# Patient Record
Sex: MALE | Race: WHITE | NOT HISPANIC OR LATINO | Employment: FULL TIME | URBAN - METROPOLITAN AREA
[De-identification: names, ages, dates, MRNs, and addresses within clinical notes are randomized per-mention and may not be internally consistent; named-entity substitution may affect disease eponyms.]

---

## 2017-08-28 NOTE — PATIENT INSTRUCTIONS
Before Your Surgery      Call your surgeon if there is any change in your health. This includes signs of a cold or flu (such as a sore throat, runny nose, cough, rash or fever).    Do not smoke, drink alcohol or take over the counter medicine (unless your surgeon or primary care doctor tells you to) for the 24 hours before and after surgery.    If you take prescribed drugs: Follow your doctor s orders about which medicines to take and which to stop until after surgery.    Eating and drinking prior to surgery: follow the instructions from your surgeon    Take a shower or bath the night before surgery. Use the soap your surgeon gave you to gently clean your skin. If you do not have soap from your surgeon, use your regular soap. Do not shave or scrub the surgery site.  Wear clean pajamas and have clean sheets on your bed.     [facility]    If you have any questions regarding to your visit please contact your care team:       Team Purple:   Clinic Hours Telephone Number   Dr. Allison Haro     7am-7pm  Monday - Thursday   7am-5pm  Fridays  [phone]  (Appointment scheduling available 24/7)    Questions about your Visit?   Team Line:  [phone]   Urgent Care - [place] and [place] - 11am-9pm Monday-Friday Saturday-Sunday- 9am-5pm   [place] - 5pm-9pm Monday-Friday Saturday-Sunday- 9am-5pm  [phone] - [place]   [phone] - [place]       What options do I have for visits at the clinic other than the traditional office visit?  To expand how we care for you, many of our providers are utilizing electronic visits (e-visits) and telephone visits, when medically appropriate, for interactions with their patients rather than a visit in the clinic.   We also offer nurse visits for many medical concerns. Just like any other service, we will bill your insurance company for this type of visit based on time spent on the phone with your  provider. Not all insurance companies cover these visits. Please check with your medical insurance if this type of visit is covered. You will be responsible for any charges that are not paid by your insurance.      E-visits via [email or web address]:  generally incur a $35.00 fee.  Telephone visits:  Time spent on the phone: *charged based on time that is spent on the phone in increments of 10 minutes. Estimated cost:   5-10 mins $30.00   11-20 mins. $59.00   21-30 mins. $85.00     Use [employer] (secure email communication and access to your chart) to send your primary care provider a message or make an appointment. Ask someone on your Team how to sign up for Genomas.  For a Price Quote for your services, please call our Consumer Price Line at [phone].  As always, Thank you for trusting us with your health care needs!

## 2017-08-28 NOTE — PROGRESS NOTES
[facility]  [address] [place] [address]  [place] [address]  [phone]  Dept: [phone]    PRE-OP EVALUATION:  Today's date: 2017    Sabino Atwood (: 1955) presents for pre-operative evaluation assessment as requested by Dr. Sabino Fregoso.  He requires evaluation and anesthesia risk assessment prior to undergoing surgery/procedure for treatment of torn left medial meniscal tear .  Proposed procedure: left knee arthroscopy     Date of Surgery/ Procedure: 9/15/17  Time of Surgery/ Procedure: [facility]  Hospital/Surgical Facility: [facility] [place], [place]    Primary Physician: Arabella Hendricks  Type of Anesthesia Anticipated: to be determined    Patient has a Health Care Directive or Living Will:  NO    Preop Questions 2017   1.  Do you have a history of heart attack, stroke, stent, bypass or surgery on an artery in the head, neck, heart or legs? No   2.  Do you ever have any pain or discomfort in your chest? No   3.  Do you have a history of  Heart Failure? No   4.   Are you troubled by shortness of breath when:  walking on a level surface, or up a slight hill, or at night? No   5.  Do you currently have a cold, bronchitis or other respiratory infection? No   6.  Do you have a cough, shortness of breath, or wheezing? No   7.  Do you sometimes get pains in the calves of your legs when you walk? No   8. Do you or anyone in your family have previous history of blood clots? No   9.  Do you or does anyone in your family have a serious bleeding problem such as prolonged bleeding following surgeries or cuts? No   10. Have you ever had problems with anemia or been told to take iron pills? No   11. Have you had any abnormal blood loss such as black, tarry or bloody stools? No   12. Have you ever had a blood transfusion? No   13. Have you or any of your relatives ever had problems with anesthesia? No   14. Do you have sleep apnea, excessive snoring or daytime drowsiness? No   15. Do  you have any prosthetic heart valves? No   16. Do you have prosthetic joints? No           HPI:  Patient has had left knee pain and swelling off and on for the past 1 1/2 years. Causing him to walk in a awkward way that is straining his back. Left knee prevents him from riding his bike. Knee hurts when he walks the 8 flights up stairs at his work.                                                       Brief HPI related to upcoming procedure: patient needs arthroscopy for left knee medial meniscal tear       No chronic health problems     MEDICAL HISTORY:                                                    Patient Active Problem List    Diagnosis Date Noted     CARDIOVASCULAR SCREENING; LDL GOAL LESS THAN 160 06/28/2011     Priority: Medium     DDD (degenerative disc disease), lumbar 06/28/2011     Priority: Medium     Lyme disease      Priority: Medium     Nephrolithiasis      Priority: Medium      Past Medical History:   Diagnosis Date     DDD (degenerative disc disease), lumbar 6/28/2011     Lyme disease      Nephrolithiasis      Past Surgical History:   Procedure Laterality Date     microdiscectomy      Multiple surgeries for back issues     Right knee surgery      Right ACL ablated     Current Outpatient Prescriptions   Medication Sig Dispense Refill     IBUPROFEN PO Take 200 mg by mouth every 6 hours       OTC products: None, except as noted above    No Known Allergies   Latex Allergy: NO    Social History   Substance Use Topics     Smoking status: Never Smoker     Smokeless tobacco: Never Used     Alcohol use Yes      Comment: once a month or less (glass of wine)     History   Drug Use No       REVIEW OF SYSTEMS:                                                    C: NEGATIVE for fever, chills, change in weight  I: NEGATIVE for worrisome rashes, moles or lesions  E: NEGATIVE for vision changes or irritation  E/M: NEGATIVE for ear, mouth and throat problems  R: NEGATIVE for significant cough or SOB  B: NEGATIVE  "for masses, tenderness or discharge  CV: NEGATIVE for chest pain, palpitations or peripheral edema  GI: NEGATIVE for nausea, abdominal pain, heartburn, or change in bowel habits  : NEGATIVE for frequency, dysuria, or hematuria  M: NEGATIVE for significant arthralgias or myalgia  N: NEGATIVE for weakness, dizziness or paresthesias  E: NEGATIVE for temperature intolerance, skin/hair changes  H: NEGATIVE for bleeding problems  P: NEGATIVE for changes in mood or affect    EXAM:                                                    /72 (BP Location: Left arm, Patient Position: Chair, Cuff Size: Adult Regular)  Pulse 58  Temp 98.3  F (36.8  C)  Ht 6' 0.5\" (1.842 m)  Wt 244 lb 8 oz (110.9 kg)  SpO2 98%  BMI 32.7 kg/m2    GENERAL APPEARANCE: healthy, alert and no distress     EYES: EOMI,  PERRL     HENT: ear canals and TM's normal and nose and mouth without ulcers or lesions     NECK: no adenopathy, no asymmetry, masses, or scars and thyroid normal to palpation     RESP: lungs clear to auscultation - no rales, rhonchi or wheezes     CV: regular rates and rhythm, normal S1 S2, no S3 or S4 and no murmur, click or rub     ABDOMEN:  soft, nontender, no HSM or masses and bowel sounds normal     MS: extremities normal- no gross deformities noted, no evidence of inflammation in joints, FROM in all extremities.     SKIN: no suspicious lesions or rashes     NEURO: Normal strength and tone, sensory exam grossly normal, mentation intact and speech normal     PSYCH: mentation appears normal. and affect normal/bright     LYMPHATICS: No axillary, cervical, or supraclavicular nodes    DIAGNOSTICS:                                                    EKG: sinus bradycardia, normal axis, normal intervals, no acute ST/T changes c/w ischemia, no LVH by voltage criteria    Recent Labs   Lab Test  09/12/12   1700   HGB  14.6   PLT  291   NA  145*   POTASSIUM  4.6   CR  1.14        IMPRESSION:                                             "        Reason for surgery/procedure: painful left knee   Diagnosis/reason for consult: need for EKG and clearance     The proposed surgical procedure is considered LOW risk.    REVISED CARDIAC RISK INDEX  The patient has the following serious cardiovascular risks for perioperative complications such as (MI, PE, VFib and 3  AV Block):  No serious cardiac risks  INTERPRETATION: 0 risks: Class I (very low risk - 0.4% complication rate)    The patient has the following additional risks for perioperative complications:  No identified additional risks      ICD-10-CM    1. Preop general physical exam Z01.818 EKG 12-lead complete w/read - Clinics   2. Need for prophylactic vaccination with tetanus-diphtheria (TD) Z23 TDAP VACCINE (ADACEL)       RECOMMENDATIONS:                                                      --Consult hospital rounder / IM to assist post-op medical management    --Patient is to take all scheduled medications on the day of surgery EXCEPT for modifications listed below.    APPROVAL GIVEN to proceed with proposed procedure, without further diagnostic evaluation       Signed Electronically by: KENNEY SAM MD    Copy of this evaluation report is provided to requesting physician.    [facility] Preop Guidelines

## 2017-08-29 ENCOUNTER — OFFICE VISIT (OUTPATIENT)
Dept: FAMILY MEDICINE | Facility: CLINIC | Age: 62
End: 2017-08-29
Payer: COMMERCIAL

## 2017-08-29 VITALS
HEART RATE: 58 BPM | DIASTOLIC BLOOD PRESSURE: 72 MMHG | BODY MASS INDEX: 32.4 KG/M2 | TEMPERATURE: 98.3 F | HEIGHT: 73 IN | WEIGHT: 244.5 LBS | SYSTOLIC BLOOD PRESSURE: 132 MMHG | OXYGEN SATURATION: 98 %

## 2017-08-29 DIAGNOSIS — Z23 NEED FOR PROPHYLACTIC VACCINATION WITH TETANUS-DIPHTHERIA (TD): ICD-10-CM

## 2017-08-29 DIAGNOSIS — Z01.818 PREOP GENERAL PHYSICAL EXAM: Primary | ICD-10-CM

## 2017-08-29 PROCEDURE — 90715 TDAP VACCINE 7 YRS/> IM: CPT | Performed by: FAMILY MEDICINE

## 2017-08-29 PROCEDURE — 90471 IMMUNIZATION ADMIN: CPT | Performed by: FAMILY MEDICINE

## 2017-08-29 PROCEDURE — 93000 ELECTROCARDIOGRAM COMPLETE: CPT | Performed by: FAMILY MEDICINE

## 2017-08-29 PROCEDURE — 99204 OFFICE O/P NEW MOD 45 MIN: CPT | Mod: 25 | Performed by: FAMILY MEDICINE

## 2017-08-29 NOTE — MR AVS SNAPSHOT
After Visit Summary   8/29/2017    Sabino Atwood    MRN: [id number]           Patient Information     Date Of Birth          1955        Visit Information        Provider Department      8/29/2017 11:30 AM Allison Lui MD [facility]        Today's Diagnoses     Preop general physical exam    -  1    Need for prophylactic vaccination with tetanus-diphtheria (TD)          Care Instructions      Before Your Surgery      Call your surgeon if there is any change in your health. This includes signs of a cold or flu (such as a sore throat, runny nose, cough, rash or fever).    Do not smoke, drink alcohol or take over the counter medicine (unless your surgeon or primary care doctor tells you to) for the 24 hours before and after surgery.    If you take prescribed drugs: Follow your doctor s orders about which medicines to take and which to stop until after surgery.    Eating and drinking prior to surgery: follow the instructions from your surgeon    Take a shower or bath the night before surgery. Use the soap your surgeon gave you to gently clean your skin. If you do not have soap from your surgeon, use your regular soap. Do not shave or scrub the surgery site.  Wear clean pajamas and have clean sheets on your bed.     [facility]    If you have any questions regarding to your visit please contact your care team:       Team Purple:   Clinic Hours Telephone Number   Dr. Allison Haro     7am-7pm  Monday - Thursday   7am-5pm  Fridays  [phone]  (Appointment scheduling available 24/7)    Questions about your Visit?   Team Line:  [phone]   Urgent Care - [place] - 11am-9pm Monday-Friday Saturday-Sunday- 9am-5pm   [place] - 5pm-9pm Monday-Friday Saturday-Sunday- 9am-5pm  [phone] - [place]  [phone] - [place]       What options do I have for visits at the clinic other than the  traditional office visit?  To expand how we care for you, many of our providers are utilizing electronic visits (e-visits) and telephone visits, when medically appropriate, for interactions with their patients rather than a visit in the clinic.   We also offer nurse visits for many medical concerns. Just like any other service, we will bill your insurance company for this type of visit based on time spent on the phone with your provider. Not all insurance companies cover these visits. Please check with your medical insurance if this type of visit is covered. You will be responsible for any charges that are not paid by your insurance.      E-visits via Planspott:  generally incur a $35.00 fee.  Telephone visits:  Time spent on the phone: *charged based on time that is spent on the phone in increments of 10 minutes. Estimated cost:   5-10 mins $30.00   11-20 mins. $59.00   21-30 mins. $85.00     Use [employer] (secure email communication and access to your chart) to send your primary care provider a message or make an appointment. Ask someone on your Team how to sign up for [employer].  For a Price Quote for your services, please call our Workface Line at [phone].  As always, Thank you for trusting us with your health care needs!              Follow-ups after your visit        Who to contact     If you have questions or need follow up information about today's clinic visit or your schedule please contact [facility] directly at [phone].  Normal or non-critical lab and imaging results will be communicated to you by S[employer] NOWhart, letter or phone within 4 business days after the clinic has received the results. If you do not hear from us within 7 days, please contact the clinic through Planspott or phone. If you have a critical or abnormal lab result, we will notify you by phone as soon as possible.  Submit refill requests through [employer] or call your pharmacy and they will forward the refill request to us.  "Please allow 3 business days for your refill to be completed.          Additional Information About Your Visit        MyChart Information     Wizperthart gives you secure access to your electronic health record. If you see a primary care provider, you can also send messages to your care team and make appointments. If you have questions, please call your primary care clinic.  If you do not have a primary care provider, please call [phone] and they will assist you.        Care EveryWhere ID     This is your Care EveryWhere ID. This could be used by other organizations to access your [place] medical records  [id number]        Your Vitals Were     Pulse Temperature Height Pulse Oximetry BMI (Body Mass Index)       58 98.3  F (36.8  C) 6' 0.5\" (1.842 m) 98% 32.7 kg/m2        Blood Pressure from Last 3 Encounters:   08/29/17 132/72   09/12/12 112/60   06/28/11 114/72    Weight from Last 3 Encounters:   08/29/17 244 lb 8 oz (110.9 kg)   09/12/12 242 lb 12.8 oz (110.1 kg)   06/28/11 243 lb (110.2 kg)              We Performed the Following     EKG 12-lead complete w/read - Clinics     TDAP VACCINE (ADACEL)        Primary Care Provider Office Phone # Fax #    Arabella Hendricks [phone]       XXX NO INFO FOUND XXX  XXX [place] [address]        Equal Access to Services     [facility]: Hadii aad ku hadasho Sokassi, waaxda luqadaha, qaybta kaalmada donald, gordy mustafa . So [facility] [phone].    ATENCIÓN: Si habla español, tiene a tavarez disposición servicios gratuitos de asistencia lingüística. Liss al [phone].    We comply with applicable federal civil rights laws and [place] laws. We do not discriminate on the basis of race, color, national origin, age, disability sex, sexual orientation or gender identity.            Thank you!     Thank you for choosing [facility]  for your care. Our goal is always to provide you with excellent care. Hearing back from our " patients is one way we can continue to improve our services. Please take a few minutes to complete the written survey that you may receive in the mail after your visit with us. Thank you!             Your Updated Medication List - Protect others around you: Learn how to safely use, store and throw away your medicines at www.disposemymeds.org.          This list is accurate as of: 8/29/17 12:08 PM.  Always use your most recent med list.                   Brand Name Dispense Instructions for use Diagnosis    IBUPROFEN PO      Take 200 mg by mouth every 6 hours

## 2017-08-29 NOTE — NURSING NOTE
"Chief Complaint   Patient presents with     Pre-Op Exam       Initial /72 (BP Location: Left arm, Patient Position: Chair, Cuff Size: Adult Regular)  Pulse 58  Temp 98.3  F (36.8  C)  Ht 6' 0.5\" (1.842 m)  Wt 244 lb 8 oz (110.9 kg)  SpO2 98%  BMI 32.7 kg/m2 Estimated body mass index is 32.7 kg/(m^2) as calculated from the following:    Height as of this encounter: 6' 0.5\" (1.842 m).    Weight as of this encounter: 244 lb 8 oz (110.9 kg).  Medication Reconciliation: complete   Shaniqua Porter MA      "

## 2017-08-29 NOTE — Clinical Note
Please abstract the following data from this visit with this patient into the appropriate field in Epic:  Colonoscopy done on this date: 3/11/2008 (approximately), by this group: Care Everywhere, results were normal.

## 2017-08-29 NOTE — NURSING NOTE
Screening Questionnaire for Adult Immunization    Are you sick today?   No   Do you have allergies to medications, food, a vaccine component or latex?   No   Have you ever had a serious reaction after receiving a vaccination?   No   Do you have a long-term health problem with heart disease, lung disease, asthma, kidney disease, metabolic disease (e.g. diabetes), anemia, or other blood disorder?   No   Do you have cancer, leukemia, HIV/AIDS, or any other immune system problem?   No   In the past 3 months, have you taken medications that affect  your immune system, such as prednisone, other steroids, or anticancer drugs; drugs for the treatment of rheumatoid arthritis, Crohn s disease, or psoriasis; or have you had radiation treatments?   No   Have you had a seizure, or a brain or other nervous system problem?   No   During the past year, have you received a transfusion of blood or blood     products, or been given immune (gamma) globulin or antiviral drug?   No   For women: Are you pregnant or is there a chance you could become        pregnant during the next month?   No   Have you received any vaccinations in the past 4 weeks?   No     Immunization questionnaire answers were all negative.        Per orders of Dr. Lui, injection of Tdap (Adacel) given by An Fontenot. Patient instructed to remain in clinic for 15 minutes afterwards, and to report any adverse reaction to me immediately.       Screening performed by An Fontenot on 8/29/2017 at 12:19 PM.

## 2020-03-01 ENCOUNTER — HEALTH MAINTENANCE LETTER (OUTPATIENT)
Age: 65
End: 2020-03-01

## 2020-06-25 ENCOUNTER — OFFICE VISIT (OUTPATIENT)
Dept: FAMILY MEDICINE | Facility: CLINIC | Age: 65
End: 2020-06-25
Payer: COMMERCIAL

## 2020-06-25 VITALS
BODY MASS INDEX: 31.83 KG/M2 | WEIGHT: 248 LBS | RESPIRATION RATE: 14 BRPM | HEIGHT: 74 IN | DIASTOLIC BLOOD PRESSURE: 80 MMHG | HEART RATE: 70 BPM | SYSTOLIC BLOOD PRESSURE: 130 MMHG | TEMPERATURE: 97.5 F | OXYGEN SATURATION: 98 %

## 2020-06-25 DIAGNOSIS — I82.4Y2 ACUTE DEEP VEIN THROMBOSIS (DVT) OF PROXIMAL VEIN OF LEFT LOWER EXTREMITY (H): ICD-10-CM

## 2020-06-25 DIAGNOSIS — R59.9 ADENOPATHY: ICD-10-CM

## 2020-06-25 DIAGNOSIS — M79.89 LEFT LEG SWELLING: Primary | ICD-10-CM

## 2020-06-25 LAB
BASOPHILS # BLD AUTO: 0 10E9/L (ref 0–0.2)
BASOPHILS NFR BLD AUTO: 0.3 %
D DIMER PPP FEU-MCNC: 1.4 UG/ML FEU (ref 0–0.5)
DIFFERENTIAL METHOD BLD: NORMAL
EOSINOPHIL # BLD AUTO: 0.1 10E9/L (ref 0–0.7)
EOSINOPHIL NFR BLD AUTO: 1.2 %
ERYTHROCYTE [DISTWIDTH] IN BLOOD BY AUTOMATED COUNT: 14.9 % (ref 10–15)
HCT VFR BLD AUTO: 43 % (ref 40–53)
HGB BLD-MCNC: 14 G/DL (ref 13.3–17.7)
LYMPHOCYTES # BLD AUTO: 1.2 10E9/L (ref 0.8–5.3)
LYMPHOCYTES NFR BLD AUTO: 20.4 %
MCH RBC QN AUTO: 27.9 PG (ref 26.5–33)
MCHC RBC AUTO-ENTMCNC: 32.6 G/DL (ref 31.5–36.5)
MCV RBC AUTO: 86 FL (ref 78–100)
MONOCYTES # BLD AUTO: 0.6 10E9/L (ref 0–1.3)
MONOCYTES NFR BLD AUTO: 9.9 %
NEUTROPHILS # BLD AUTO: 4 10E9/L (ref 1.6–8.3)
NEUTROPHILS NFR BLD AUTO: 68.2 %
PLATELET # BLD AUTO: 211 10E9/L (ref 150–450)
RBC # BLD AUTO: 5.02 10E12/L (ref 4.4–5.9)
WBC # BLD AUTO: 5.9 10E9/L (ref 4–11)

## 2020-06-25 PROCEDURE — 85379 FIBRIN DEGRADATION QUANT: CPT | Performed by: FAMILY MEDICINE

## 2020-06-25 PROCEDURE — 99214 OFFICE O/P EST MOD 30 MIN: CPT | Performed by: FAMILY MEDICINE

## 2020-06-25 PROCEDURE — 85025 COMPLETE CBC W/AUTO DIFF WBC: CPT | Performed by: FAMILY MEDICINE

## 2020-06-25 PROCEDURE — 36415 COLL VENOUS BLD VENIPUNCTURE: CPT | Performed by: FAMILY MEDICINE

## 2020-06-25 ASSESSMENT — MIFFLIN-ST. JEOR: SCORE: 1984.67

## 2020-06-26 ENCOUNTER — TELEPHONE (OUTPATIENT)
Dept: FAMILY MEDICINE | Facility: CLINIC | Age: 65
End: 2020-06-26

## 2020-06-26 ENCOUNTER — ANCILLARY PROCEDURE (OUTPATIENT)
Dept: ULTRASOUND IMAGING | Facility: CLINIC | Age: 65
End: 2020-06-26
Attending: FAMILY MEDICINE
Payer: COMMERCIAL

## 2020-06-26 DIAGNOSIS — R59.9 ADENOPATHY: ICD-10-CM

## 2020-06-26 DIAGNOSIS — M79.89 LEFT LEG SWELLING: ICD-10-CM

## 2020-06-26 LAB — RADIOLOGIST FLAGS: ABNORMAL

## 2020-06-26 PROCEDURE — 93971 EXTREMITY STUDY: CPT | Mod: LT | Performed by: STUDENT IN AN ORGANIZED HEALTH CARE EDUCATION/TRAINING PROGRAM

## 2020-06-26 NOTE — TELEPHONE ENCOUNTER
Reason for call:  Results   Name of test or procedure:  Ultra sound   Date of test or procedure: 6-  Location of test or procedure:  [place]    Additional comments:  Call for results    Phone number to reach patient:  Home number on file [phone] (home)    Best Time:   Any     Can we leave a detailed message on this number?  YES    Travel screening: Not Applicable

## 2020-06-26 NOTE — TELEPHONE ENCOUNTER
Dr. Fuller notified and already aware of US results that were positive for DVT in L lower extremity.     Ally Smith RN

## 2020-07-13 ENCOUNTER — OFFICE VISIT (OUTPATIENT)
Dept: FAMILY MEDICINE | Facility: CLINIC | Age: 65
End: 2020-07-13
Payer: COMMERCIAL

## 2020-07-13 VITALS
BODY MASS INDEX: 31.2 KG/M2 | TEMPERATURE: 97.3 F | HEART RATE: 71 BPM | DIASTOLIC BLOOD PRESSURE: 62 MMHG | WEIGHT: 243 LBS | SYSTOLIC BLOOD PRESSURE: 118 MMHG | OXYGEN SATURATION: 98 %

## 2020-07-13 DIAGNOSIS — I82.4Y2 ACUTE DEEP VEIN THROMBOSIS (DVT) OF PROXIMAL VEIN OF LEFT LOWER EXTREMITY (H): ICD-10-CM

## 2020-07-13 DIAGNOSIS — L60.0 INGROWING RIGHT GREAT TOENAIL: ICD-10-CM

## 2020-07-13 DIAGNOSIS — I82.402 ACUTE DEEP VEIN THROMBOSIS (DVT) OF LEFT LOWER EXTREMITY, UNSPECIFIED VEIN (H): Primary | ICD-10-CM

## 2020-07-13 DIAGNOSIS — L30.8 PRURITIC DERMATITIS: ICD-10-CM

## 2020-07-13 PROCEDURE — 99214 OFFICE O/P EST MOD 30 MIN: CPT | Performed by: FAMILY MEDICINE

## 2020-07-13 NOTE — PROGRESS NOTES
Subjective     Sabino Atwood is a 64 year old male who presents to clinic today for the following health issues:    HPI   Musculoskeletal problem/pain      Duration: Had DVT in the LLE on Xeralto    Description  Location: Left leg    Intensity:  Slightly better    Accompanying signs and symptoms: rash    History  Previous similar problem: no   Previous evaluation:  none    Precipitating or alleviating factors:  Trauma or overuse: no   Aggravating factors include: none    Therapies tried and outcome: lotion    DVT: Lt leg; subsequent  Had DVT, possibly from prolonged sitting as working from home during the lock down.  Swelling improved a little. No pain.  He has no personal or family history of blood clots.    Rash on the leg:    Very itchy, mainly on left.    Ingrown toe Nail    This has been going on for a very long time, when worse just trims it.    Patient Active Problem List   Diagnosis     CARDIOVASCULAR SCREENING; LDL GOAL LESS THAN 160     DDD (degenerative disc disease), lumbar     Lyme disease     Nephrolithiasis     Past Surgical History:   Procedure Laterality Date     ARTHROSCOPY KNEE Left 9/2017    medial meniscal tear      microdiscectomy      Multiple surgeries for back issues     Right knee surgery      Right ACL ablated       Social History     Tobacco Use     Smoking status: Never Smoker     Smokeless tobacco: Never Used   Substance Use Topics     Alcohol use: Yes     Comment: once a month or less (glass of wine)     Family History   Problem Relation Age of Onset     Heart Disease Mother         CHF     Neurologic Disorder Father         Polio with respiratory complications.     Arthritis Brother      Family History Negative Maternal Grandmother      Family History Negative Maternal Grandfather      Family History Negative Paternal Grandmother      Family History Negative Paternal Grandfather          Review of Systems   Constitutional, HEENT, cardiovascular, pulmonary, gi and gu systems are  negative, except as otherwise noted.      Objective    /62   Pulse 71   Temp 97.3  F (36.3  C) (Oral)   Wt 110.2 kg (243 lb)   SpO2 98%   BMI 31.20 kg/m    Body mass index is 31.2 kg/m .  Physical Exam   GENERAL: healthy, alert and no distress  RESP: lungs clear to auscultation - no rales, rhonchi or wheezes  CV: regular rate and rhythm, no murmur, click or rub, no peripheral edema   MS:   Legs: isolated excoriation charlie in the shins  Lt Leg   Moderate swelling, no calf tenderness  Rt Foot    In grown toe nail    Assessment & Plan     Sabino was seen today for musculoskeletal problem.    Diagnoses and all orders for this visit:    Acute deep vein thrombosis (DVT) of left lower extremity, unspecified vein (H)    Continue Xeralto for at least 3 months. This was a provoked DVT.        -    Xeralto    Ingrowing right great toenail        -    Will return for possible wedge resection.    Acute deep vein thrombosis (DVT) of proximal vein of left lower extremity (H)  -     rivaroxaban ANTICOAGULANT (XARELTO) 20 MG TABS tablet; Take 1 tablet (20 mg) by mouth daily (with dinner)    Pruritic dermatitis: legs       -    Doing calamine lotion.  Other:        -     Lipid panel reflex to direct LDL Fasting; Future  -     **Hepatitis C Screen Reflex to RNA FUTURE anytime; Future      Return in about 1 month (around 8/13/2020) for Routine Visit.    Carlos Fuller MD  [facility]

## 2020-07-14 DIAGNOSIS — I82.402 ACUTE DEEP VEIN THROMBOSIS (DVT) OF LEFT LOWER EXTREMITY, UNSPECIFIED VEIN (H): ICD-10-CM

## 2020-07-14 LAB
CHOLEST SERPL-MCNC: 180 MG/DL
HDLC SERPL-MCNC: 55 MG/DL
LDLC SERPL CALC-MCNC: 104 MG/DL
NONHDLC SERPL-MCNC: 125 MG/DL
TRIGL SERPL-MCNC: 103 MG/DL

## 2020-07-14 PROCEDURE — 86803 HEPATITIS C AB TEST: CPT | Performed by: FAMILY MEDICINE

## 2020-07-14 PROCEDURE — 80061 LIPID PANEL: CPT | Performed by: FAMILY MEDICINE

## 2020-07-14 PROCEDURE — 36415 COLL VENOUS BLD VENIPUNCTURE: CPT | Performed by: FAMILY MEDICINE

## 2020-07-15 LAB — HCV AB SERPL QL IA: NONREACTIVE

## 2020-08-12 NOTE — PROGRESS NOTES
Subjective     Sabino Atwood is a 65 year old male who presents to clinic today for the following health issues:    HPI       Chief Complaint   Patient presents with     Ingrown Toenail     Right great toe x 25 years     Has ingrown toe nail in Rt foot with recurrent flare up, but been persistent lately.  He is taking Xarelto for DVT    Review of Systems   Constitutional, HEENT, cardiovascular, pulmonary, gi and gu systems are negative, except as otherwise noted.      Objective    There were no vitals taken for this visit.  There is no height or weight on file to calculate BMI.  Physical Exam   GENERAL: healthy, alert and no distress  RESP: lungs clear to auscultation - no rales, rhonchi or wheezes  CV: regular rate and rhythm, no murmur, click or rub, no peripheral edema   MS: Ingrwon toe nail Rt great toe medially    Assessment & Plan     Sabino was seen today for ingrown toenail.    Diagnoses and all orders for this visit:    Ingrowing nail, right great toe     Procedure:   Procedure:  After informed consent was obtained, using Betadine for cleansing and 1% Lidocaine without epinephrine for anesthetic, with sterile technique, wedge resection of ingrown nail was performed. Antibiotic dressing is applied, and wound care instructions provided.  Be alert for any signs of cutaneous infection. The procedure was well tolerated without complications. Continue soaking and applying bacitracin ointment daily.  Aftercare: Let nail grow out past nail fold on side of toe.  Follow up: The patient may return prn..      Return in about 1 month (around 9/14/2020) for Physical with fasting labs.    Carlos Fuller MD  [facility]

## 2020-08-14 ENCOUNTER — OFFICE VISIT (OUTPATIENT)
Dept: FAMILY MEDICINE | Facility: CLINIC | Age: 65
End: 2020-08-14
Payer: COMMERCIAL

## 2020-08-14 VITALS
SYSTOLIC BLOOD PRESSURE: 120 MMHG | OXYGEN SATURATION: 98 % | DIASTOLIC BLOOD PRESSURE: 74 MMHG | WEIGHT: 255 LBS | TEMPERATURE: 97.6 F | HEART RATE: 59 BPM | BODY MASS INDEX: 32.74 KG/M2

## 2020-08-14 DIAGNOSIS — L60.0 INGROWING NAIL, RIGHT GREAT TOE: Primary | ICD-10-CM

## 2020-08-14 PROCEDURE — 11765 WEDGE EXCISION SKN NAIL FOLD: CPT | Mod: T5 | Performed by: FAMILY MEDICINE

## 2020-09-11 NOTE — PROGRESS NOTES
"  SUBJECTIVE:   Sabino Atwood is a 65 year old male who presents for Preventive Visit.    Are you in the first 12 months of your Medicare Part B coverage?  No    Physical Health:    In general, how would you rate your overall physical health? good    Outside of work, how many days during the week do you exercise? 2-3 days/week    Outside of work, approximately how many minutes a day do you exercise?greater than 60 minutes    If you drink alcohol do you typically have >3 drinks per day or >7 drinks per week? Not Applicable    Do you usually eat at least 4 servings of fruit and vegetables a day, include whole grains & fiber and avoid regularly eating high fat or \"junk\" foods? NO    Do you have any problems taking medications regularly?  No    Do you have any side effects from medications? no    Needs assistance for the following daily activities: no assistance needed    Which of the following safety concerns are present in your home?  none identified     Hearing impairment: No    In the past 6 months, have you been bothered by leaking of urine? no    Mental Health:    In general, how would you rate your overall mental or emotional health? good  PHQ-2 Score:      Do you feel safe in your environment? YES    Have you ever done Advance Care Planning? (For example, a Health Directive, POLST, or a discussion with a medical provider or your loved ones about your wishes): No, advance care planning information given to patient to review.  Patient plans to discuss their wishes with loved ones or provider.      Additional concerns to address?  No    Fall risk: No     click delete button to remove this line now  Cognitive Screenin) Repeat 3 items (Leader, Season, Table)    2) Clock draw: NORMAL  3) 3 item recall: Recalls 2 objects   Results: NORMAL clock, 1-2 items recalled: COGNITIVE IMPAIRMENT LESS LIKELY    Mini-CogTM Copyright S Ariana. Licensed by the author for use in [facility]; reprinted with " permission (isaiah@[email or web address]). All rights reserved.      Do you have sleep apnea, excessive snoring or daytime drowsiness?: no    -------------------------------------    Reviewed and updated as needed this visit by clinical staff  Allergies  Meds         Reviewed and updated as needed this visit by Provider        Social History     Tobacco Use     Smoking status: Never Smoker     Smokeless tobacco: Never Used   Substance Use Topics     Alcohol use: Yes     Comment: once a month or less (glass of wine)     Urine flow: Slowed down                      Current providers sharing in care for this patient include:   Patient Care Team:  [facility] as [facility]CP - General  Carlos Fuller MD as Assigned PCP    The following health maintenance items are reviewed in Epic and correct as of today:  Health Maintenance   Topic Date Due     ADVANCE CARE PLANNING  1955     HIV SCREENING  08/11/1970     ZOSTER IMMUNIZATION (1 of 2) 08/11/2005     FALL RISK ASSESSMENT  08/11/2020     AORTIC ANEURYSM SCREENING (SYSTEM ASSIGNED)  08/11/2020     INFLUENZA VACCINE (1) 09/01/2020     PNEUMOCOCCAL IMMUNIZATION 65+ LOW/MEDIUM RISK (1 of 2 - PCV13) 08/11/2020     MEDICARE ANNUAL WELLNESS VISIT  09/14/2021     COLORECTAL CANCER SCREENING  05/05/2024     LIPID  07/14/2025     DTAP/TDAP/TD IMMUNIZATION (2 - Td) 08/29/2027     HEPATITIS C SCREENING  Completed     PHQ-2  Completed     IPV IMMUNIZATION  Aged Out     MENINGITIS IMMUNIZATION  Aged Out     HEPATITIS B IMMUNIZATION  Aged Out     Patient Active Problem List   Diagnosis     CARDIOVASCULAR SCREENING; LDL GOAL LESS THAN 160     DDD (degenerative disc disease), lumbar     Lyme disease     Nephrolithiasis     Past Surgical History:   Procedure Laterality Date     ARTHROSCOPY KNEE Left 9/2017    medial meniscal tear      microdiscectomy      Multiple surgeries for back issues     Right knee surgery      Right ACL ablated       Social History     Tobacco Use     Smoking status:  "Never Smoker     Smokeless tobacco: Never Used   Substance Use Topics     Alcohol use: Yes     Comment: once a month or less (glass of wine)     Family History   Problem Relation Age of Onset     Heart Disease Mother         CHF     Neurologic Disorder Father         Polio with respiratory complications.     Arthritis Brother      Family History Negative Maternal Grandmother      Family History Negative Maternal Grandfather      Family History Negative Paternal Grandmother      Family History Negative Paternal Grandfather          Pneumonia Vaccine:Adults age 65+ who received Pneumovax (PPSV23) at 65 years or older: Should be given PCV13 > 1 year after their most recent PPSV23    ROS:  Constitutional, HEENT, cardiovascular, pulmonary, gi and gu systems are negative, except as otherwise noted.    OBJECTIVE:   /82   Pulse 55   Temp 97.5  F (36.4  C) (Oral)   Wt 114.8 kg (253 lb)   SpO2 97%   BMI 32.48 kg/m   Estimated body mass index is 32.48 kg/m  as calculated from the following:    Height as of 6/25/20: 1.88 m (6' 2\").    Weight as of this encounter: 114.8 kg (253 lb).  EXAM:   GENERAL: healthy, alert and no distress  EYES: Eyes grossly normal to inspection, PERRL and conjunctivae and sclerae normal  HENT: ear canals and TM's normal, nose and mouth without ulcers or lesions  NECK: no adenopathy and thyroid normal to palpation  RESP: lungs clear to auscultation - no rales, rhonchi or wheezes  CV: regular rate and rhythm, normal S1 S2, no S3 or S4, no murmur, click or rub, no peripheral edema   ABDOMEN: soft, nontender,no masses and bowel sounds normal  : Non tender solitary adenopathy in left groin  MS: no gross musculoskeletal defects noted, no edema  SKIN: no suspicious lesions or rashes  NEURO: Normal strength and tone, mentation intact and speech normal  PSYCH: mentation appears normal, affect normal/bright    ASSESSMENT / PLAN:   Sabino was seen today for wellness visit and flu shot.    Diagnoses " "and all orders for this visit:    Encounter for Medicare annual wellness exam  -     Basic metabolic panel  -     PSA, screen    Slow urinary stream       -     Been going on for awhile. PSA    Inguinal adenopathy (Lt solitary adenopathy)      -    Lt groin.        CBC was normal. Will check PSA; and consider Imaging ? CT    Acute deep vein thrombosis (DVT) of proximal vein of lower extremity, unspecified laterality (H)      -   Started treatment 6/25/2020, provisionally for 3 months.    Need for vaccination  -     Pneumococcal vaccine 23 valent PPSV23  (Pneumovax) [64805]    Other orders  -     HC FLU VACCINE, INCREASED ANTIGEN, PRESV FREE [52910]    COUNSELING:  Reviewed preventive health counseling, as reflected in patient instructions       Regular exercise       Healthy diet/nutrition       Bladder control       Fall risk prevention       Immunizations    Vaccinated for: Influenza and Pneumococcal             Prostate cancer screening    Estimated body mass index is 32.48 kg/m  as calculated from the following:    Height as of 6/25/20: 1.88 m (6' 2\").    Weight as of this encounter: 114.8 kg (253 lb).    Weight management plan: Discussed healthy diet and exercise guidelines    He reports that he has never smoked. He has never used smokeless tobacco.    Appropriate preventive services were discussed with this patient, including applicable screening as appropriate for cardiovascular disease, diabetes, osteopenia/osteoporosis, and glaucoma.  As appropriate for age/gender, discussed screening for colorectal cancer, prostate cancer, breast cancer, and cervical cancer. Checklist reviewing preventive services available has been given to the patient.    Reviewed patients plan of care and provided an AVS. The Basic Care Plan (routine screening as documented in Health Maintenance) for Sabino meets the Care Plan requirement. This Care Plan has been established and reviewed with the Patient.    Counseling Resources:  ATP " IV Guidelines  Pooled Cohorts Equation Calculator  Breast Cancer Risk Calculator  BRCA-Related Cancer Risk Assessment: FHS-7 Tool  FRAX Risk Assessment  ICSI Preventive Guidelines  Dietary Guidelines for Americans, 2010  USDA's MyPlate  ASA Prophylaxis  Lung CA Screening    Carlos Fuller MD  [facility]

## 2020-09-11 NOTE — PATIENT INSTRUCTIONS
Patient Education   Personalized Prevention Plan  You are due for the preventive services outlined below.  Your care team is available to assist you in scheduling these services.  If you have already completed any of these items, please share that information with your care team to update in your medical record.  Health Maintenance Due   Topic Date Due     Discuss Advance Care Planning  1955     HIV Screening  08/11/1970     Zoster (Shingles) Vaccine (1 of 2) 08/11/2005     Annual Wellness Visit  08/11/2020     FALL RISK ASSESSMENT  08/11/2020     AORTIC ANEURYSM SCREENING (SYSTEM ASSIGNED)  08/11/2020     Flu Vaccine (1) 09/01/2020     Pneumococcal Vaccine (1 of 2 - PCV13) 08/11/2020

## 2020-09-14 ENCOUNTER — OFFICE VISIT (OUTPATIENT)
Dept: FAMILY MEDICINE | Facility: CLINIC | Age: 65
End: 2020-09-14
Payer: COMMERCIAL

## 2020-09-14 VITALS
TEMPERATURE: 97.5 F | HEART RATE: 55 BPM | SYSTOLIC BLOOD PRESSURE: 130 MMHG | OXYGEN SATURATION: 97 % | BODY MASS INDEX: 32.48 KG/M2 | WEIGHT: 253 LBS | DIASTOLIC BLOOD PRESSURE: 82 MMHG

## 2020-09-14 DIAGNOSIS — R59.0 INGUINAL ADENOPATHY: ICD-10-CM

## 2020-09-14 DIAGNOSIS — R39.198 SLOW URINARY STREAM: ICD-10-CM

## 2020-09-14 DIAGNOSIS — I82.4Y9 ACUTE DEEP VEIN THROMBOSIS (DVT) OF PROXIMAL VEIN OF LOWER EXTREMITY, UNSPECIFIED LATERALITY (H): ICD-10-CM

## 2020-09-14 DIAGNOSIS — Z00.00 ENCOUNTER FOR MEDICARE ANNUAL WELLNESS EXAM: Primary | ICD-10-CM

## 2020-09-14 DIAGNOSIS — Z23 NEED FOR VACCINATION: ICD-10-CM

## 2020-09-14 LAB
ANION GAP SERPL CALCULATED.3IONS-SCNC: 7 MMOL/L (ref 3–14)
BUN SERPL-MCNC: 22 MG/DL (ref 7–30)
CALCIUM SERPL-MCNC: 9 MG/DL (ref 8.5–10.1)
CHLORIDE SERPL-SCNC: 108 MMOL/L (ref 94–109)
CO2 SERPL-SCNC: 27 MMOL/L (ref 20–32)
CREAT SERPL-MCNC: 1.19 MG/DL (ref 0.66–1.25)
GFR SERPL CREATININE-BSD FRML MDRD: 64 ML/MIN/{1.73_M2}
GLUCOSE SERPL-MCNC: 92 MG/DL (ref 70–99)
POTASSIUM SERPL-SCNC: 4.3 MMOL/L (ref 3.4–5.3)
PSA SERPL-ACNC: 0.64 UG/L (ref 0–4)
SODIUM SERPL-SCNC: 142 MMOL/L (ref 133–144)

## 2020-09-14 PROCEDURE — 80048 BASIC METABOLIC PNL TOTAL CA: CPT | Performed by: FAMILY MEDICINE

## 2020-09-14 PROCEDURE — 36415 COLL VENOUS BLD VENIPUNCTURE: CPT | Performed by: FAMILY MEDICINE

## 2020-09-14 PROCEDURE — 99397 PER PM REEVAL EST PAT 65+ YR: CPT | Mod: 25 | Performed by: FAMILY MEDICINE

## 2020-09-14 PROCEDURE — G0103 PSA SCREENING: HCPCS | Performed by: FAMILY MEDICINE

## 2020-09-14 PROCEDURE — 90732 PPSV23 VACC 2 YRS+ SUBQ/IM: CPT | Performed by: FAMILY MEDICINE

## 2020-09-14 PROCEDURE — 90472 IMMUNIZATION ADMIN EACH ADD: CPT | Performed by: FAMILY MEDICINE

## 2020-09-14 PROCEDURE — 90662 IIV NO PRSV INCREASED AG IM: CPT | Performed by: FAMILY MEDICINE

## 2020-09-14 PROCEDURE — 90471 IMMUNIZATION ADMIN: CPT | Performed by: FAMILY MEDICINE

## 2021-04-15 ENCOUNTER — E-VISIT (OUTPATIENT)
Dept: URGENT CARE | Facility: URGENT CARE | Age: 66
End: 2021-04-15
Payer: COMMERCIAL

## 2021-04-15 DIAGNOSIS — Z20.822 SUSPECTED COVID-19 VIRUS INFECTION: ICD-10-CM

## 2021-04-15 DIAGNOSIS — Z20.822 SUSPECTED COVID-19 VIRUS INFECTION: Primary | ICD-10-CM

## 2021-04-15 PROCEDURE — U0005 INFEC AGEN DETEC AMPLI PROBE: HCPCS | Performed by: PHYSICIAN ASSISTANT

## 2021-04-15 PROCEDURE — 99421 OL DIG E/M SVC 5-10 MIN: CPT | Performed by: PHYSICIAN ASSISTANT

## 2021-04-15 PROCEDURE — U0003 INFECTIOUS AGENT DETECTION BY NUCLEIC ACID (DNA OR RNA); SEVERE ACUTE RESPIRATORY SYNDROME CORONAVIRUS 2 (SARS-COV-2) (CORONAVIRUS DISEASE [COVID-19]), AMPLIFIED PROBE TECHNIQUE, MAKING USE OF HIGH THROUGHPUT TECHNOLOGIES AS DESCRIBED BY CMS-2020-01-R: HCPCS | Performed by: PHYSICIAN ASSISTANT

## 2021-04-15 NOTE — PATIENT INSTRUCTIONS
Dear Sabino Atwood,    Your symptoms show that you may have coronavirus (COVID-19). This illness can cause fever, cough and trouble breathing. Many people get a mild case and get better on their own. Some people can get very sick.    Will I be tested for COVID-19?  We would like to test you for Covid-19 virus. I have placed orders for this test.     To schedule: go to your [employer] home page and scroll down to the section that says  You have an appointment that needs to be scheduled  and click the large green button that says  Schedule Now  and follow the steps to find the next available openings.    If you are unable to complete these [employer] scheduling steps, please call [phone] to schedule your testing.     Return to work/school/ guidance:  Please let your workplace manager and staffing office know when your quarantine ends     We can t give you an exact date as it depends on the above. You can calculate this on your own or work with your manager/staffing office to calculate this. (For example if you were exposed on 10/4, you would have to quarantine for 14 full days. That would be through 10/18. You could return on 10/19.)      If you receive a positive COVID-19 test result, follow the guidance of the those who are giving you the results. Usually the return to work is 10 (or in some cases 20 days from symptom onset.) If you work at [facility], you must also be cleared by Employee Occupational Health and Safety to return to work.        If you receive a negative COVID-19 test result and did not have a high risk exposure to someone with a known positive COVID-19 test, you can return to work once you're free of fever for 24 hours without fever-reducing medication and your symptoms are improving or resolved.      If you receive a negative COVID-19 test and If you had a high risk exposure to someone who has tested positive for COVID-19 then you can return to work 14 days after your last contact  with the positive individual    Note: If you have ongoing exposure to the covid positive person, this quarantine period may be more than 14 days. (For example, if you are continued to be exposed to your child who tested positive and cannot isolate from them, then the quarantine of 7-14 days can't start until your child is no longer contagious. This is typically 10 days from onset of the child's symptoms. So the total duration may be 17-24 days in this case.)    Sign up for [employer].   We know it's scary to hear that you might have COVID-19. We want to track your symptoms to make sure you're okay over the next 2 weeks. Please look for an email from [employer]--this is a free, online program that we'll use to keep in touch. To sign up, follow the link in the email you will receive. Learn more at [email or web address]    How can I take care of myself?    Get lots of rest. Drink extra fluids (unless a doctor has told you not to)    Take Tylenol (acetaminophen) or ibuprofen for fever or pain. If you have liver or kidney problems, ask your family doctor if it's okay to take Tylenol o ibuprofen    If you have other health problems (like cancer, heart failure, an organ transplant or severe kidney disease): Call your specialty clinic if you don't feel better in the next 2 days.    Know when to call 911. Emergency warning signs include:  o Trouble breathing or shortness of breath  o Pain or pressure in the chest that doesn't go away  o Feeling confused like you haven't felt before, or not being able to wake up  o Bluish-colored lips or face    Where can I get more information?  [facility] [employer] - About COVID-19:   [email or web address]    CDC - What to Do If You're Sick:   www.cdc.gov/coronavirus/2019-ncov/about/steps-when-sick.html

## 2021-04-16 ENCOUNTER — TELEPHONE (OUTPATIENT)
Dept: URGENT CARE | Facility: URGENT CARE | Age: 66
End: 2021-04-16

## 2021-04-16 LAB
LABORATORY COMMENT REPORT: ABNORMAL
SARS-COV-2 RNA RESP QL NAA+PROBE: NORMAL
SARS-COV-2 RNA RESP QL NAA+PROBE: POSITIVE
SPECIMEN SOURCE: ABNORMAL
SPECIMEN SOURCE: NORMAL

## 2021-04-16 NOTE — TELEPHONE ENCOUNTER
Coronavirus (COVID-19) Notification    Reason for call  Notify of POSITIVE  COVID-19 lab result, assess symptoms,  review [facility] recommendations    Lab Result   Lab test for 2019-nCoV rRt-PCR or SARS-COV-2 PCR  Oropharyngeal AND/OR nasopharyngeal swabs were POSITIVE for 2019-nCoV RNA [OR] SARS-COV-2 RNA (COVID-19) RNA     We have been unable to reach Patient by phone at this time to notify of their Positive COVID-19 result.  Left voicemail message requesting a call back to [phone] [facility] for results.        POSITIVE COVID-19 Letter sent.    Rosario Rodrigez LPN

## 2021-08-19 ENCOUNTER — TELEPHONE (OUTPATIENT)
Dept: OTHER | Facility: CLINIC | Age: 66
End: 2021-08-19

## 2021-09-15 ENCOUNTER — TELEPHONE (OUTPATIENT)
Dept: OTHER | Facility: CLINIC | Age: 66
End: 2021-09-15

## 2021-09-15 ENCOUNTER — OFFICE VISIT (OUTPATIENT)
Dept: OTHER | Facility: CLINIC | Age: 66
End: 2021-09-15
Attending: INTERNAL MEDICINE
Payer: COMMERCIAL

## 2021-09-15 VITALS
HEART RATE: 52 BPM | SYSTOLIC BLOOD PRESSURE: 142 MMHG | RESPIRATION RATE: 16 BRPM | WEIGHT: 256 LBS | DIASTOLIC BLOOD PRESSURE: 88 MMHG | OXYGEN SATURATION: 98 % | BODY MASS INDEX: 33.93 KG/M2 | HEIGHT: 73 IN

## 2021-09-15 DIAGNOSIS — I87.009 POST-THROMBOTIC SYNDROME: ICD-10-CM

## 2021-09-15 DIAGNOSIS — E66.811 CLASS 1 OBESITY WITHOUT SERIOUS COMORBIDITY WITH BODY MASS INDEX (BMI) OF 33.0 TO 33.9 IN ADULT, UNSPECIFIED OBESITY TYPE: ICD-10-CM

## 2021-09-15 DIAGNOSIS — I82.512 CHRONIC DEEP VEIN THROMBOSIS (DVT) OF FEMORAL VEIN OF LEFT LOWER EXTREMITY (H): Primary | ICD-10-CM

## 2021-09-15 DIAGNOSIS — I83.893 VARICOSE VEINS OF BILATERAL LOWER EXTREMITIES WITH OTHER COMPLICATIONS: ICD-10-CM

## 2021-09-15 PROCEDURE — 99205 OFFICE O/P NEW HI 60 MIN: CPT | Performed by: INTERNAL MEDICINE

## 2021-09-15 PROCEDURE — G0463 HOSPITAL OUTPT CLINIC VISIT: HCPCS

## 2021-09-15 ASSESSMENT — MIFFLIN-ST. JEOR: SCORE: 1995.09

## 2021-09-15 NOTE — PATIENT INSTRUCTIONS
1. Use compression stockings 20-30 mm hg thigh high closed toes day time and elevate legs when able     2. Take adult aspirin 325 mg daily with food     3. Go for venous comp studies then virtual visit with me    4. Lose weight     5. Please get Covid vaccine

## 2021-09-15 NOTE — TELEPHONE ENCOUNTER
Follow up to 9/15/21:    Please arrange for:      BLE Venous competency ultrasound    Virtual visit one week later.    Rocío Em RN BSN  [facility]  [phone]

## 2021-09-15 NOTE — PROGRESS NOTES
"[facility]        Patient is here for a consult to discuss DVT/PE      BP (!) 142/88 (BP Location: Left arm, Patient Position: Chair, Cuff Size: Adult Large)   Pulse 52   Resp 16   Ht 6' 1\" (1.854 m)   Wt 256 lb (116.1 kg)   SpO2 98%   BMI 33.78 kg/m      The provider has been notified that the patient has concerns of about his left lower leg.     Questions patient would like addressed today are: N/A.    Refills are needed: No    Has homecare services and agency name:  [facility]ini [facility]      "

## 2021-09-15 NOTE — PROGRESS NOTES
[facility] INITIAL VASCULAR MEDICINE CONSULT    ( NEW PATIENT VISIT)     PRIMARY HEALTH CARE PROVIDER:   iglesia Fuller MD       REFERRING HEALTH CARE PROVIDER;  Self referred        REASON FOR CONSULT: Evaluation and management of first lifetime thromboembolic event involving extensive left lower extremity DVT from femoral vein to below-knee veins diagnosed in June 2020 and treated with 3-month duration of Xarelto then followed by aspirin and he stopped that medication      HPI: Sabino Atwood is a 66 year old very pleasant obese male non-smoker during pandemic working from home sitting almost 8 to 10 hours a day developed left lower extremity swelling discomfort pain and underwent evaluation in June 2020 extensive DVT as delineated below and treated with Xarelto 3 months followed by aspirin and aspirin was also stopped recently after he was planning to donate plasma.  He was diagnosed Covid in April 2021.  He never received vaccine.  No personal history of malignancy.  He is obese with positional snoring but no apneic episodes and no daytime somnolence.  He now developed varicose veins and still left lower extremity looks larger than right side but no pain.  He is not using any compression stockings    He is new to this clinic reviewed available records in the epic and updated chart    PAST MEDICAL HISTORY  Past Medical History:   Diagnosis Date     DDD (degenerative disc disease), lumbar 6/28/2011     Lyme disease      Nephrolithiasis        CURRENT MEDICATIONS  None    PAST SURGICAL HISTORY:  Past Surgical History:   Procedure Laterality Date     ARTHROSCOPY KNEE Left 9/2017    medial meniscal tear      microdiscectomy      Multiple surgeries for back issues     Right knee surgery      Right ACL ablated       ALLERGIES     Allergies   Allergen Reactions     Tetanus Toxoid Other (See Comments)     Had shot in 2006       FAMILY HISTORY  Family History   Problem Relation Age  of Onset     Heart Disease Mother         CHF     Neurologic Disorder Father         Polio with respiratory complications.     Arthritis Brother      Family History Negative Maternal Grandmother      Family History Negative Maternal Grandfather      Family History Negative Paternal Grandmother      Family History Negative Paternal Grandfather        VASCULAR FAMILY HISTORY  1st order relative with atherosclerotic PAD: No  1st order relative with AAA: No  Family history of Familial Hyperlipidemia No  Family History of Hypercoagulable state:No    VASCULAR RISK FACTORS  1. Diabetes:No   2. Smoking: has never smoked.  3. HTN: uncontrolled  4.Hyperlipidemia: No      SOCIAL HISTORY  Social History     Socioeconomic History     Marital status:      Spouse name: Not on file     Number of children: Not on file     Years of education: Not on file     Highest education level: Not on file   Occupational History     Not on file   Tobacco Use     Smoking status: Never Smoker     Smokeless tobacco: Never Used   Substance and Sexual Activity     Alcohol use: Yes     Comment: once a month or less (glass of wine)     Drug use: No     Sexual activity: Not Currently   Other Topics Concern     Parent/sibling w/ CABG, MI or angioplasty before 65F 55M? Not Asked   Social History Narrative     Not on file     Social Determinants of Health     Financial Resource Strain:      Difficulty of Paying Living Expenses:    Food Insecurity:      Worried About Running Out of Food in the Last Year:      Ran Out of Food in the Last Year:    Transportation Needs:      Lack of Transportation (Medical):      Lack of Transportation (Non-Medical):    Physical Activity:      Days of Exercise per Week:      Minutes of Exercise per Session:    Stress:      Feeling of Stress :    Social Connections:      Frequency of Communication with Friends and Family:      Frequency of Social Gatherings with Friends and Family:      Attends [employer]ondo[employer] Services:       "Active Member of Clubs or Organizations:      Attends Club or Organization Meetings:      Marital Status:    Intimate Partner Violence:      Fear of Current or Ex-Partner:      Emotionally Abused:      Physically Abused:      Sexually Abused:        ROS:   General: No change in weight, sleep or appetite.  Normal energy.  No fever or chills  Eyes: Negative for vision changes or eye problems  ENT: No problems with ears, nose or throat.  No difficulty swallowing.  Resp: No coughing, wheezing or shortness of breath  CV: No chest pains or palpitations  GI: No nausea, vomiting,  heartburn, abdominal pain, diarrhea, constipation or change in bowel habits  : No urinary frequency or dysuria, bladder or kidney problems  Musculoskeletal: No significant muscle or joint pains, previous history of arthroscopic knee surgery and back microdiscectomy etc.  Neurologic: No headaches, numbness, tingling, weakness, problems with balance or coordination  Psychiatric: No problems with anxiety, depression or mental health  Heme/immune/allergy: No history of bleeding or clotting problems or anemia.  No allergies or immune system problems  Endocrine: No history of thyroid disease, diabetes or other endocrine disorders  Skin: No rashes,worrisome lesions or skin problems  Vascular:  No claudication, lifestyle limiting or otherwise; no ischemic rest pain; no non-healing ulcers. No weakness, No loss of sensation    Left lower extremity is larger than the right side with varicose veins left more than right side    EXAM:  BP (!) 142/88 (BP Location: Left arm, Patient Position: Chair, Cuff Size: Adult Large)   Pulse 52   Resp 16   Ht 6' 1\" (1.854 m)   Wt 256 lb (116.1 kg)   SpO2 98%   BMI 33.78 kg/m    In general, the patient is a pleasant male in no apparent distress.    HEENT: NC/AT.  PERRLA.  EOMI.  Sclerae white, not injected.  Nares clear.  Pharynx without erythema or exudate.  Dentition intact.    Neck: No adenopathy.  No thyromegaly. " Carotids +2/2 bilaterally without bruits.  No jugular venous distension.   Heart: RRR. Normal S1, S2 splits physiologically. No murmur, rub, click, or gallop. The PMI is in the 5th ICS in the midclavicular line. There is no heave.    Lungs: CTA.  No ronchi, wheezes, rales.  No dullness to percussion.   Abdomen: Soft, nontender, nondistended. No organomegaly. No AAA.  No bruits.   Extremities: Vascular:   Left lower extremity is larger than right side with a trace pitting edema  Palpable pulses femoral, popliteal, DP PT bilaterally, left lower extremity bi-tri phasic pedal pulses  Varicose veins left much worse with CEAP 4 CVI and right side CEAP 1 CVI  No foot ulcers or leg ulcers    Labs:  LIPID RESULTS:  Lab Results   Component Value Date    CHOL 180 07/14/2020    HDL 55 07/14/2020     (H) 07/14/2020    TRIG 103 07/14/2020       LIVER ENZYME RESULTS:  Lab Results   Component Value Date    AST 37 09/12/2012    ALT 35 09/12/2012       CBC RESULTS:  Lab Results   Component Value Date    WBC 5.9 06/25/2020    RBC 5.02 06/25/2020    HGB 14.0 06/25/2020    HCT 43.0 06/25/2020    MCV 86 06/25/2020    MCH 27.9 06/25/2020    MCHC 32.6 06/25/2020    RDW 14.9 06/25/2020     06/25/2020       BMP RESULTS:  Lab Results   Component Value Date     09/14/2020    POTASSIUM 4.3 09/14/2020    CHLORIDE 108 09/14/2020    CO2 27 09/14/2020    ANIONGAP 7 09/14/2020    GLC 92 09/14/2020    BUN 22 09/14/2020    CR 1.19 09/14/2020    GFRESTIMATED 64 09/14/2020    GFRESTBLACK 74 09/14/2020    BRIGID 9.0 09/14/2020        Procedures:     EXAMINATION: DOPPLER VENOUS ULTRASOUND OF THE LEFT LOWER EXTREMITY,  6/26/2020 9:55 AM      COMPARISON: None.     HISTORY: Left leg swelling     TECHNIQUE:  Gray-scale evaluation with compression, spectral flow, and  color Doppler assessment of the deep venous system of the left leg  from groin to knee, and then at the ankle.     FINDINGS:  Nonocclusive thrombus left femoral vein mid thigh  extending into the  distal thigh.  In the popliteal vein their is occlusive thrombus which  extends into the posterior tibial and peroneal veins.   The posterior  tibial vein is patent at the ankle.     Patent right common femoral vein for comparison.     Incidental benign appearing left inguinal lymph node.                                                                      IMPRESSION:  Positive DVT study.  Left lower extremity non occlusive  and occlusive thrombus involving the mid thigh femoral vein into the  calf veins as above.      [Urgent Result: Left lower extremity DVT]     Finding was identified on 6/26/2020 10:02 AM.      Dr. Fuller was contacted by Dr. Pete at 6/26/2020 10:02 AM and  verbalized understanding of the urgent finding.      CARINE PETE MD      Assessment and Plan:     1. Chronic deep vein thrombosis (DVT) of femoral vein of left lower extremity (H) ( extensive DVT FV to bleow knee veins 6/2020) appears provoked prolonged sitting 8-10 hours a day working from home , first life time PE event .      2. Post thrombotic syndrome     3. Varicose veins of bilateral lower extremities Left > RT CEAP 4 CVI and CEAP 1 CVI respectively  with other complications    4. Class 1 obesity without serious comorbidity with body mass index (BMI) of 33.0 to 33.9 in adult, unspecified obesity type      This is a very pleasant 66-year-old male developed provoked first lifetime thromboembolic event in his left lower extremity nonocclusive thrombus in the femoral vein and occlusive thrombus in the popliteal, below-knee veins in June 2020.  He was working from home 8 to 10 hours of sitting.  No chest pain or shortness of breath.  He was treated with Xarelto 3 months then switched to aspirin and recently he stopped aspirin as well when he was planning to donate plasma.  Now he developed left lower extremity swelling with minimal edema no pain there is a redness erythema.  He is non-smoker, no personal history of  malignancy.  No family history of hypercoagulable status.  He has a previous history of microdiscectomy and also arthroscopic knee surgeries.    Reviewed pathophysiology treatment options and given education sheets on venous insufficiency, DVT and varicose veins etc..  Trace leg edema with discoloration part of the venous insufficiency and post thrombotic syndrome.  He will benefit with compression stockings prescription given thigh-high 20 to 30 mmHg utilize daytime and elevate the leg when able  We will arrange venous competency studies then followed by virtual visit.  No need for hypercoagulable studies this is first lifetime thromboembolic event provoked  Suggested patient to take aspirin 325 mg coated with the food daily  Lose weight  If snoring persists or daytime somnolence consider going for formal sleep apnea evaluation  Establish care with primary care physician  Suggested patient to receive Covid vaccine    This note was dictated by utilizing Dragon software !    Copy of this note to primary care physician      60 minutes spent on the date of the encounter doing chart review, history and exam, documentation, and further activities as noted above.    Adamaris Laughlin MD, DIXON, FSVM,FNLA  Vascular Medicine  Clinical lipidologist  Clinical hypertension specialist

## 2021-09-30 ENCOUNTER — ANCILLARY PROCEDURE (OUTPATIENT)
Dept: ULTRASOUND IMAGING | Facility: CLINIC | Age: 66
End: 2021-09-30
Attending: INTERNAL MEDICINE
Payer: COMMERCIAL

## 2021-09-30 DIAGNOSIS — I82.512 CHRONIC DEEP VEIN THROMBOSIS (DVT) OF FEMORAL VEIN OF LEFT LOWER EXTREMITY (H): ICD-10-CM

## 2021-09-30 DIAGNOSIS — I87.009 POST-THROMBOTIC SYNDROME: ICD-10-CM

## 2021-09-30 DIAGNOSIS — I83.893 VARICOSE VEINS OF BILATERAL LOWER EXTREMITIES WITH OTHER COMPLICATIONS: ICD-10-CM

## 2021-09-30 PROCEDURE — 93970 EXTREMITY STUDY: CPT | Performed by: SURGERY

## 2021-10-02 ENCOUNTER — HEALTH MAINTENANCE LETTER (OUTPATIENT)
Age: 66
End: 2021-10-02

## 2021-10-11 ENCOUNTER — VIRTUAL VISIT (OUTPATIENT)
Dept: OTHER | Facility: CLINIC | Age: 66
End: 2021-10-11
Attending: INTERNAL MEDICINE
Payer: COMMERCIAL

## 2021-10-11 DIAGNOSIS — I87.009 POST-THROMBOTIC SYNDROME: ICD-10-CM

## 2021-10-11 DIAGNOSIS — I82.512 CHRONIC DEEP VEIN THROMBOSIS (DVT) OF FEMORAL VEIN OF LEFT LOWER EXTREMITY (H): ICD-10-CM

## 2021-10-11 DIAGNOSIS — E66.811 CLASS 1 OBESITY WITHOUT SERIOUS COMORBIDITY WITH BODY MASS INDEX (BMI) OF 33.0 TO 33.9 IN ADULT, UNSPECIFIED OBESITY TYPE: ICD-10-CM

## 2021-10-11 DIAGNOSIS — I83.893 VARICOSE VEINS OF BILATERAL LOWER EXTREMITIES WITH OTHER COMPLICATIONS: ICD-10-CM

## 2021-10-11 PROCEDURE — 99213 OFFICE O/P EST LOW 20 MIN: CPT | Mod: 95 | Performed by: INTERNAL MEDICINE

## 2021-10-11 RX ORDER — ASPIRIN 81 MG/1
81 TABLET ORAL DAILY
COMMUNITY
End: 2022-04-13

## 2021-10-11 NOTE — PATIENT INSTRUCTIONS
1.  Utilize compression stockings in both legs and elevate the legs when able    2.  Office visit with me in 6 months

## 2021-10-11 NOTE — PROGRESS NOTES
Sabino is a 66 year old who is being evaluated via a billable video visit.      How would you like to obtain your AVS? Mail a copy  If the video visit is dropped, the invitation should be resent by: Text to cell phone: [phone], press 1 to contact the patient. This will be a telephone visit, not a video visit.     Will anyone else be joining your video visit? No      Provider visit note:    Chief complaint:  Follow-up visit  Review of recent venous competency studies  Using compression stockings in the left leg  Leg symptoms better since last visit    History of present illness:     For full details please see my initial consult note    This is a very pleasant 66-year-old male initially seen in the office in 2021 for evaluation and management of first lifetime thromboembolic event involving extensive left lower extremity DVT from femoral vein to below-knee veins diagnosed in 2020 and treated with 3-month duration of Xarelto then followed by aspirin and he stopped that medication at the time of last visit but restarted 325 mg daily and underwent venous competency studies he still does have chronic DVT in the distal femoral vein and popliteal vein on the left side right side GSV and SSV incompetent and no thrombus    He started using compression stockings and overall leg symptoms are better    Review of systems: Reviewed all 12 point review of systems as per HPI otherwise unremarkable    Physical exam:( no physical exam done this is virtual visit)    Reviewed recent laboratory tests, imaging studies in the epic and updated chart    Name:  Sabino Atwood                                             Patient ID: [id number]  Date: 2021                                         : 1955  Sex: male                                                                    Examined by: SANDRA Owen RVT  Age:  66 year old                                                         Reading MD: JARON Reyes  Cyrus     INDICATION:  Patient has a history of left lower extremity DVT and pain and swelling in his left leg.      EXAM TYPE  BILATERAL LOWER EXTREMITY VENOUS DUPLEX FOR VENOUS INSUFFICIENCY  TECHNICAL SUMMARY     A duplex ultrasound study using color flow was performed, to evaluate the bilateral lower extremity veins for valvular incompetence with the patient in a steep reversed trendelenberg.      RIGHT:     The deep veins demonstrate phasic flow, compress and respond to augmentations.  There is no DVT.  The common femoral vein is incompetent and free of thrombus. The remaining deep veins are competent and free of thrombus.      The GSV demonstrates phasic flow, compresses and responds to augmentations from the saphenofemoral junction to the ankle with no evidence of thrombus. The great saphenous vein measures 7.8 mm at the saphenofemoral junction, 7.3 mm at the proximal thigh, and 4.3 mm at the knee. The GSV is incompetent from Proximal Thigh to Mid Thigh, with the greatest reflux time of 1138 milliseconds.        The AASV is not visualized.     The Giacomini vein is competent ( 2.1 mm) communicating with the small saphenous vein at the knee level.      The SSV demonstrates phasic flow, compresses and responds to augmentations from the popliteal space to the ankle.  No thrombus is seen.  The saphenopopliteal junction is absent. The SSV is incompetent from the Proximal Calf  with a reflux time of 3151 milliseconds.      Perforators: there is no evidence of incompetent  veins at any level.        LEFT:     The distal femoral vein and popliteal veins has chronic non occlusive thrombus. Partial compression was noted with color flow and response to distal augmentation in the distal femoral and popliteal veins. The remaining deep veins demonstrate phasic flow, compress and respond to augmentations.  There is no reflux or DVT.  The common femoral, proximal to distal femoral and popliteal veins are  incompetent.     The GSV demonstrates phasic flow, compresses and responds to augmentations from the saphenofemoral junction to the ankle with no evidence of thrombus. The great saphenous vein measures 10.2 mm at the saphenofemoral junction, 10.9 mm at the proximal thigh and 7.5 mm at the knee. The GSV is incompetent from SFJ to Proximal Thigh and again from the mid calf to the ankle, with the greatest reflux time of 5724 milliseconds.  The GSV gives rise to a varicose branch measuring 4.2 mm off the  Proximal Calf that courses Medial with a reflux time of 2409 milliseconds.          The AASV is competent ( 2.2 mm) draining into the saphenofemoral junction.     The Giacomini vein is competent ( 1.9 mm) communicating with the small saphenous vein at the knee level.      The SSV demonstrates phasic flow, compresses and responds to augmentations from the popliteal space to the ankle.  No reflux or thrombus is seen. The saphenopopliteal junction is absent.      Perforators: There is an incompetent  vein ( 3.5 mm) at 12 cm inferior to the medial knee crease that communicates with PTV to GSV.        FINAL SUMMARY:  1.         Left chronic non-occlusive thrombus is noted in distal femoral vein and the popliteal  2.         Right common femoral vein and left common, proximal to distal femoral, and popliteal vein incompetence.  3.         Right great saphenous vein incompetence.  4.         Right small saphenous vein incompetence.  5.         Left great saphenous vein incompetence.  6.         Left  incompetence.  7.         Left varicose vein incompetence.  8.         The time of incompetence is greater than 500 milliseconds in  and superficial veins and greater than 1000 millisecond in deep veins.     Assessment and plan:    1. Chronic deep vein thrombosis (DVT) of femoral vein of left lower extremity (H) ( extensive DVT FV to bleow knee veins 6/2020) appears provoked prolonged sitting 8-10  hours a day working from home , first life time PE event .       2. Post thrombotic syndrome      3. Varicose veins of bilateral lower extremities Left > RT CEAP 4 CVI and CEAP 1 CVI respectively  with other complications     4. Class 1 obesity without serious comorbidity with body mass index (BMI) of 33.0 to 33.9 in adult, unspecified obesity type        This is a very pleasant 66-year-old male developed provoked first lifetime thromboembolic event in his left lower extremity nonocclusive thrombus in the femoral vein and occlusive thrombus in the popliteal, below-knee veins in June 2020.  He was working from home 8 to 10 hours of sitting.  No chest pain or shortness of breath.  He was treated with Xarelto 3 months then switched to aspirin and recently he stopped aspirin as well when he was planning to donate plasma.  he developed left lower extremity swelling with minimal edema no pain there is a redness erythema.  He is non-smoker, no personal history of malignancy.  No family history of hypercoagulable status.  He has a previous history of microdiscectomy and also arthroscopic knee surgeries.     During last visit reviewed pathophysiology treatment options and given education sheets on venous insufficiency, DVT and varicose veins etc..  Last visit noted Trace leg edema with discoloration part of the venous insufficiency and post thrombotic syndrome, now improved per patient  Started using compression stockings on the left side and symptoms improved   Suggested patient to continue aspirin 325 mg coated with the food daily  He has a incompetence of the GSV and SSV on the right side but no clot he will benefit with compression on the right side also suggested him to utilize in both legs  Lose weight  If snoring persists or daytime somnolence consider going for formal sleep apnea evaluation  Establish care with primary care physician  Suggested patient to receive Covid vaccine    RTC 6 months in office visit         This visit is being conducted as a virtual visit due to the emphasis on mitigation of the COVID-19 virus pandemic. The clinician has decided that the risk of an in-office visit outweighs the benefit for this patient.      Adamaris Laughlin MD, DIXON, FS,LA  Vascular Medicine  Clinical lipidologist  Clinical hypertension specialist

## 2021-11-27 ENCOUNTER — HEALTH MAINTENANCE LETTER (OUTPATIENT)
Age: 66
End: 2021-11-27

## 2022-01-13 NOTE — TELEPHONE ENCOUNTER
Patient needs to be scheduled for in-person consult with vascular medicine at next available.     Johanne BARBOZAN, RN    [facility]  [facility]  Office: [phone]  Fax: [phone]        
Scheduled for 9/15/2021 with Dr. Laughlin in  [place].     Sandra CASEY   
Tried to reach Sabino to schedule NEW CONSULT with Vascular Medicine.     He just stepped out the door. Number was taken by wife and states she will have him call back to schedule.     Sandra CASEY     
[facility]    Who is the name of the provider?:  New      What is the location you see this provider at?: [place] / [place]    Reason for call:  Treated last year for DVT in left leg.  Foot is still discolored, no pain, not warm to touch.     Can we leave a detailed message on this number?  YES - Note: Must press 1 to bypass fidelia call blocker to connect with patient       
Statement Selected

## 2022-02-23 NOTE — TELEPHONE ENCOUNTER
"-Coronavirus (COVID-19) Notification    Caller Name (Patient, parent, daughter/son, grandparent, etc)  Patient     Reason for call  Notify of Positive Coronavirus (COVID-19) lab results, assess symptoms,  review  [employer] [facility] recommendations    Lab Result    Lab test:  2019-nCoV rRt-PCR or SARS-CoV-2 PCR    Oropharyngeal AND/OR nasopharyngeal swabs is POSITIVE for 2019-nCoV RNA/SARS-COV-2 PCR (COVID-19 virus)    RN Recommendations/Instructions per [facility] Coronavirus COVID-19 recommendations    Brief introduction script  Introduce self then review script:  \"I am calling on behalf of [employer].  We were notified that your Coronavirus test (COVID-19) for was POSITIVE for the virus.  I have some information to relay to you but first I wanted to mention that the [place] Dept of Health will be contacting you shortly [it's possible MD already called Patient] to talk to you more about how you are feeling and other people you have had contact with who might now also have the virus.  Also,  [employer] [facility] is Partnering with the [facility] for Covid-19 research, you may be contacted directly by research staff.\"    Assessment (Inquire about Patient's current symptoms)   Assessment   Current Symptoms at time of phone call: (if no symptoms, document No symptoms] Fatigue, chills, some flatulants   Symptoms onset (if applicable) 5 days ago     If at time of call, Patients symptoms hare worsened, the Patient should contact 911 or have someone drive them to Emergency Dept promptly:      If Patient calling 911, inform 911 personal that you have tested positive for the Coronavirus (COVID-19).  Place mask on and await 911 to arrive.    If Emergency Dept, If possible, please have another adult drive you to the Emergency Dept but you need to wear mask when in contact with other people.      Monoclonal Antibody Administration    You may be eligible to receive a new treatment with a monoclonal antibody for " Chief Complaint   Patient presents with    Hypertension    Other     post covid     Reviewed record in preparation for visit and have obtained necessary documentation. Identified pt with two pt identifiers(name and ). Health Maintenance Due   Topic    Hepatitis C Screening     COVID-19 Vaccine (1)    Shingrix Vaccine Age 49> (1 of 2)    Colorectal Cancer Screening Combo     Flu Vaccine (1)         Chief Complaint   Patient presents with    Hypertension    Other     post covid        Wt Readings from Last 3 Encounters:   22 349 lb 6.4 oz (158.5 kg)   21 348 lb 9.6 oz (158.1 kg)   20 349 lb 3.2 oz (158.4 kg)     Temp Readings from Last 3 Encounters:   22 97.5 °F (36.4 °C) (Temporal)   21 97.6 °F (36.4 °C) (Temporal)   20 97.5 °F (36.4 °C) (Oral)     BP Readings from Last 3 Encounters:   22 (!) 160/100   21 (!) 160/100   20 (!) 157/100     Pulse Readings from Last 3 Encounters:   22 80   21 90   20 73           Learning Assessment:  :     Learning Assessment 5/3/2016   PRIMARY LEARNER Patient   HIGHEST LEVEL OF EDUCATION - PRIMARY LEARNER  > 4 YEARS OF COLLEGE   BARRIERS PRIMARY LEARNER NONE   CO-LEARNER CAREGIVER No   PRIMARY LANGUAGE ENGLISH   LEARNER PREFERENCE PRIMARY DEMONSTRATION   ANSWERED BY patient   RELATIONSHIP SELF       Depression Screening:  :     3 most recent PHQ Screens 2022   Little interest or pleasure in doing things Not at all   Feeling down, depressed, irritable, or hopeless Not at all   Total Score PHQ 2 0       Fall Risk Assessment:  :     No flowsheet data found. Abuse Screening:  :     Abuse Screening Questionnaire 2022 2022 3/13/2018   Do you ever feel afraid of your partner? N N N   Are you in a relationship with someone who physically or mentally threatens you? N N N   Is it safe for you to go home?  Sari MAYNARD       Coordination of Care Questionnaire:  :     1) Have you been to an "preventing hospitalization in patients at high risk for complications from COVID-19.   This medication is still experimental and available on a limited basis; it is given through an IV and must be given at an infusion center. Please note that not all people who are eligible will receive the medication since it is in limited supply.     Are you interested in being considered for this medication?  No.   Does the patient fit the criteria: No    If patient qualifies based on above criteria:  \"You will be contacted if you are selected to receive this treatment in the next 1-2 business days.   This is time sensitive and if you are not selected in the next 1-2 business days, you will not receive the medication.  If you do not receive a call to schedule, you have not been selected.\"      Review information with Patient    Your result was positive. This means you have COVID-19 (coronavirus).  We have sent you a letter that reviews the information that I'll be reviewing with you now.    How can I protect others?    If you have symptoms: stay home and away from others (self-isolate) until:    You've had no fever--and no medicine that reduces fever--for 1 full day (24 hours). And       Your other symptoms have gotten better. For example, your cough or breathing has improved. And     At least 10 days have passed since your symptoms started. (If you've been told by a doctor that you have a weak immune system, wait 20 days.)     If you don't have symptoms: Stay home and away from others (self-isolate) until at least 10 days have passed since your first positive COVID-19 test. (Date test collected)    During this time:    Stay in your own room, including for meals. Use your own bathroom if you can.    Stay away from others in your home. No hugging, kissing or shaking hands. No visitors.     Don't go to work, school or anywhere else.     Clean  high touch  surfaces often (doorknobs, counters, handles, etc.). Use a household cleaning " emergency room, urgent care clinic since your last visit? yes   Hospitalized since your last visit? no             2) Have you seen or consulted any other health care providers outside of [address] [place] [address] since your last visit? yes  (Include any pap smears or colon screenings in this section.)    3) Do you have an Advance Directive on file? no    4) Are you interested in receiving information on Advance Directives? NO      Patient is accompanied by self I have received verbal consent from Saúl Del Rosario to discuss any/all medical information while they are present in the room. Reviewed record  In preparation for visit and have obtained necessary documentation. spray or wipes. You'll find a full list on the EPA website at www.epa.gov/pesticide-registration/list-n-disinfectants-use-against-sars-cov-2.     Cover your mouth and nose with a mask, tissue or other face covering to avoid spreading germs.    Wash your hands and face often with soap and water.    Make a list of people you have been in close contact with recently, even if either of you wore a face covering.   ; Start your list from 2 days before you became ill or had a positive test.  ; Include anyone that was within 6 feet of you for a cumulative total of 15 minutes or more in 24 hours. (Example: if you sat next to Jason for 5 minutes in the morning and 10 minutes in the afternoon, then you were in close contact for 15 minutes total that day. Jason would be added to your list.)    A public health worker will call or text you. It is important that you answer. They will ask you questions about possible exposures to COVID-19, such as people you have been in direct contact with and places you have visited.    Tell the people on your list that you have COVID-19; they should stay away from others for 14 days starting from the last time they were in contact with you (unless you are told something different from a public health worker).     Caregivers in these groups are at risk for severe illness due to COVID-19:  o People 65 years and older  o People who live in a nursing home or long-term care facility  o People with chronic disease (lung, heart, cancer, diabetes, kidney, liver, immunologic)  o People who have a weakened immune system, including those who:  - Are in cancer treatment  - Take medicine that weakens the immune system, such as corticosteroids  - Had a bone marrow or organ transplant  - Have an immune deficiency  - Have poorly controlled HIV or AIDS  - Are obese (body mass index of 40 or higher)  - Smoke regularly    Caregivers should wear gloves while washing dishes, handling laundry and cleaning bedrooms and  bathrooms.    Wash and dry laundry with special caution. Don't shake dirty laundry, and use the warmest water setting you can.    If you have a weakened immune system, ask your doctor about other actions you should take.    For more tips, go to www.cdc.gov/coronavirus/2019-ncov/downloads/10Things.pdf.    You should not go back to work until you meet the guidelines above for ending your home isolation. You don't need to be retested for COVID-19 before going back to work--studies show that you won't spread the virus if it's been at least 10 days since your symptoms started (or 20 days, if you have a weak immune system).    Employers: This document serves as formal notice of your employee's medical guidelines for going back to work. They must meet the above guidelines before going back to work in person.    How can I take care of myself?    1. Get lots of rest. Drink extra fluids (unless a doctor has told you not to).    2. Take Tylenol (acetaminophen) for fever or pain. If you have liver or kidney problems, ask your family doctor if it's okay to take Tylenol.     Take either:     650 mg (two 325 mg pills) every 4 to 6 hours, or     1,000 mg (two 500 mg pills) every 8 hours as needed.     Note: Don't take more than 3,000 mg in one day. Acetaminophen is found in many medicines (both prescribed and over-the-counter medicines). Read all labels to be sure you don't take too much.    For children, check the Tylenol bottle for the right dose (based on their age or weight).    3. If you have other health problems (like cancer, heart failure, an organ transplant or severe kidney disease): Call your specialty clinic if you don't feel better in the next 2 days.    4. Know when to call 911: Emergency warning signs include:    Trouble breathing or shortness of breath    Pain or pressure in the chest that doesn't go away    Feeling confused like you haven't felt before, or not being able to wake up    Bluish-colored lips or  face    5. Sign up for [employer]. We know it's scary to hear that you have COVID-19. We want to track your symptoms to make sure you're okay over the next 2 weeks. Please look for an email from [employer]--this is a free, online program that we'll use to keep in touch. To sign up, follow the link in the email. Learn more at [email or web address].    Where can I get more information?    [facility]: [email or web address]    Coronavirus Basics: www.[email or web address].[place]./diseases/coronavirus/basics.html    What to Do If You're Sick: www.cdc.gov/coronavirus/2019-ncov/about/steps-when-sick.html    Ending Home Isolation: www.cdc.gov/coronavirus/2019-ncov/hcp/disposition-in-home-patients.html     Caring for Someone with COVID-19: www.cdc.gov/coronavirus/2019-ncov/if-you-are-sick/care-for-someone.html     [facility] clinical trials (COVID-19 research studies): clinicalaffairs.[facility]-clinical-trials     A Positive COVID-19 letter will be sent via [employer] or the mail. (Exception, no letters sent to Presurgerical/Preprocedure Patients)    Rosario Rodrigez LPN

## 2022-04-13 ENCOUNTER — OFFICE VISIT (OUTPATIENT)
Dept: OTHER | Facility: CLINIC | Age: 67
End: 2022-04-13
Attending: INTERNAL MEDICINE
Payer: COMMERCIAL

## 2022-04-13 VITALS
SYSTOLIC BLOOD PRESSURE: 134 MMHG | OXYGEN SATURATION: 100 % | HEART RATE: 48 BPM | DIASTOLIC BLOOD PRESSURE: 84 MMHG | BODY MASS INDEX: 32.09 KG/M2 | WEIGHT: 243.2 LBS

## 2022-04-13 DIAGNOSIS — I82.512 CHRONIC DEEP VEIN THROMBOSIS (DVT) OF FEMORAL VEIN OF LEFT LOWER EXTREMITY (H): Primary | ICD-10-CM

## 2022-04-13 DIAGNOSIS — I87.009 POST-THROMBOTIC SYNDROME: ICD-10-CM

## 2022-04-13 DIAGNOSIS — E66.811 CLASS 1 OBESITY WITHOUT SERIOUS COMORBIDITY WITH BODY MASS INDEX (BMI) OF 33.0 TO 33.9 IN ADULT, UNSPECIFIED OBESITY TYPE: ICD-10-CM

## 2022-04-13 DIAGNOSIS — I83.893 VARICOSE VEINS OF BILATERAL LOWER EXTREMITIES WITH OTHER COMPLICATIONS: ICD-10-CM

## 2022-04-13 PROCEDURE — 99214 OFFICE O/P EST MOD 30 MIN: CPT | Performed by: INTERNAL MEDICINE

## 2022-04-13 PROCEDURE — G0463 HOSPITAL OUTPT CLINIC VISIT: HCPCS

## 2022-04-13 NOTE — PATIENT INSTRUCTIONS
Compression, leg elevation when able     Continue aspirin daily with food     Congratulations for weight loss , continue same     RTC 6 months

## 2022-04-13 NOTE — PROGRESS NOTES
SUBJECTIVE:  CC:  Follow-up visit  History of chronic left lower extremity DVT  Bilateral lower extremity varicose veins  Post thrombotic syndrome with left leg larger than right  He lost 20 pounds since last visit  Using compression stockings  HPI:   Sabino Atwood is a 66 year old male non-smoker during pandemic working from home sitting almost 8 to 10 hours a day developed left lower extremity swelling discomfort pain and underwent evaluation in June 2020 extensive DVT as delineated below and treated with Xarelto 3 months followed by aspirin and aspirin .  He was diagnosed Covid in April 2021.  He never received vaccine.  No personal history of malignancy.  He is obese with positional snoring but no apneic episodes and no daytime somnolence.  He now developed varicose veins and still left lower extremity looks larger than right side but no pain.  He started using compression stockings  Lost 20 pounds    HISTORIES:  PROBLEM LIST:   Patient Active Problem List   Diagnosis     CARDIOVASCULAR SCREENING; LDL GOAL LESS THAN 160     DDD (degenerative disc disease), lumbar     Lyme disease     Nephrolithiasis     PAST MEDICAL HISTORY:  Past Medical History:   Diagnosis Date     Chronic deep vein thrombosis (DVT) of femoral vein of left lower extremity (H)     6/2021 first life time provoked , proloned sitting working from home     DDD (degenerative disc disease), lumbar 06/28/2011     Lyme disease      Nephrolithiasis      Varicose veins of bilateral lower extremities with other complications     Left > RT CEAP 4 CVI     PAST SURGICAL HISTORY:  Past Surgical History:   Procedure Laterality Date     ARTHROSCOPY KNEE Left 9/2017    medial meniscal tear      microdiscectomy      Multiple surgeries for back issues     Right knee surgery      Right ACL ablated     CURRENT MEDICATIONS:  Current Outpatient Medications   Medication Sig Dispense Refill     aspirin (ASA) 325 MG EC tablet Take 325 mg by mouth every 6 hours as  needed for moderate pain       aspirin 81 MG EC tablet Take 81 mg by mouth daily       ALLERGIES:  Allergies   Allergen Reactions     Tetanus Toxoid Other (See Comments)     Had shot in 2006     SOCIAL HISTORY:  Social History     Socioeconomic History     Marital status:      Spouse name: Not on file     Number of children: Not on file     Years of education: Not on file     Highest education level: Not on file   Occupational History     Not on file   Tobacco Use     Smoking status: Never Smoker     Smokeless tobacco: Never Used   Substance and Sexual Activity     Alcohol use: Yes     Comment: once a month or less (glass of wine)     Drug use: No     Sexual activity: Not Currently   Other Topics Concern     Parent/sibling w/ CABG, MI or angioplasty before 65F 55M? Not Asked   Social History Narrative     Not on file     Social Determinants of Health     Financial Resource Strain: Not on file   Food Insecurity: Not on file   Transportation Needs: Not on file   Physical Activity: Not on file   Stress: Not on file   Social Connections: Not on file   Intimate Partner Violence: Not on file   Housing Stability: Not on file     FAMILY HISTORY:  Family History   Problem Relation Age of Onset     Heart Disease Mother         CHF     Neurologic Disorder Father         Polio with respiratory complications.     Arthritis Brother      Family History Negative Maternal Grandmother      Family History Negative Maternal Grandfather      Family History Negative Paternal Grandmother      Family History Negative Paternal Grandfather      REVIEW OF SYSTEMS:  CONSTITUTIONAL:no malaise, fatigue, or other general symptoms  EYES: no subjective changes in visual acuity, no photophobia  ENT/MOUTH: no complaints of rhinorrhea, nasal congestion, sore throat, hearing changes  RESP:no SOB  CV: no c/o exertional chest pressure or WELLS  GI: No abdominal pain, constipation, change in bowel movements, nausea, pyrosis, BRBPR  :no polyuria or  polydipsia, no dysuria, no gross hematuria  MUSCULOSKELATAL:no arthalgias or myalgias, improved legs symptoms  Left leg is still larger than right side but well perfused  History of varicose veins no previous intervention  INTEGUMENTARY/SKIN: no pruritis, rashes, or moles with recent change in size, shape, or pigmentation  NEURO: no gross sensory or motor symptoms, no dizziness, no confusion  ENDOCRINE: no polyuria or polydipsia, no heat or cold intolerance  HEME/ALLERGY/IMMUNE: no fevers, chills, night sweats, or unwanted weight loss  PSYCHIATRIC: no depression, anxiety, or internal stimuli  EXAM:  /84 (BP Location: Right arm, Patient Position: Chair, Cuff Size: Adult Regular)   Pulse (!) 48   Wt 243 lb 3.2 oz (110.3 kg)   SpO2 100%   BMI 32.09 kg/m    BMI: Body mass index is 32.09 kg/m .  GENERAL APPEARANCE:  Pleasant  Healthy appearing male , alert, active, no distress cooperative.  EXAM:  EYES: clear conjunctiva, no cataracts, no obvious fundoscopic abnormalities  HENT: oropharynx, nares, and TMs are WNL  NECK: no JVD, thyromegaly or lymphadenopathy, no cervical bruits  RESP: clear to auscultation without rales, wheezes, or rhonchi  CV: RRR, no murmurs, gallops, or rubs  LYMPH: no cervical , axillary, or inguinal lymphadenopathy appreciated  GI: NABS, ND/NT, no masses or organomegally appreciated  MS: Vascular: no obvoius clinicallly relevant arthropathy, no evidence vasculitis, left leg is larger than right side  Bilateral lower extremity varicose veins CEAP 4 CVI  Palpable symmetrical peripheral pulses both femoral, popliteal, DP and PT  SKIN: no nevi clinically suspicious for malignancy are noted, no foot ulcers or leg ulcers  NEURO: CN II-XII intact, no localizing sensory or motor abnoramlities noted, DTRs symmetrical bilaterally  PSYCH: Mental status exam reveals the pt to have normal mood and affect. There is no disorder of thought form or content. There is no response to internal stimuli. There  is no suicidal or homicidal ideation.    Name:  Sabino Atwood                                             Patient ID: [id number]  Date: 2021                                         : 1955  Sex: male                                                                    Examined by: SANDRA Owen RVT  Age:  66 year old                                                         Reading MD: JARON Bridges     INDICATION:  Patient has a history of left lower extremity DVT and pain and swelling in his left leg.      EXAM TYPE  BILATERAL LOWER EXTREMITY VENOUS DUPLEX FOR VENOUS INSUFFICIENCY  TECHNICAL SUMMARY     A duplex ultrasound study using color flow was performed, to evaluate the bilateral lower extremity veins for valvular incompetence with the patient in a steep reversed trendelenberg.      RIGHT:     The deep veins demonstrate phasic flow, compress and respond to augmentations.  There is no DVT.  The common femoral vein is incompetent and free of thrombus. The remaining deep veins are competent and free of thrombus.      The GSV demonstrates phasic flow, compresses and responds to augmentations from the saphenofemoral junction to the ankle with no evidence of thrombus. The great saphenous vein measures 7.8 mm at the saphenofemoral junction, 7.3 mm at the proximal thigh, and 4.3 mm at the knee. The GSV is incompetent from Proximal Thigh to Mid Thigh, with the greatest reflux time of 1138 milliseconds.        The AASV is not visualized.     The Giacomini vein is competent ( 2.1 mm) communicating with the small saphenous vein at the knee level.      The SSV demonstrates phasic flow, compresses and responds to augmentations from the popliteal space to the ankle.  No thrombus is seen.  The saphenopopliteal junction is absent. The SSV is incompetent from the Proximal Calf  with a reflux time of 3151 milliseconds.      Perforators: there is no evidence of incompetent  veins at any  level.        LEFT:     The distal femoral vein and popliteal veins has chronic non occlusive thrombus. Partial compression was noted with color flow and response to distal augmentation in the distal femoral and popliteal veins. The remaining deep veins demonstrate phasic flow, compress and respond to augmentations.  There is no reflux or DVT.  The common femoral, proximal to distal femoral and popliteal veins are incompetent.     The GSV demonstrates phasic flow, compresses and responds to augmentations from the saphenofemoral junction to the ankle with no evidence of thrombus. The great saphenous vein measures 10.2 mm at the saphenofemoral junction, 10.9 mm at the proximal thigh and 7.5 mm at the knee. The GSV is incompetent from SFJ to Proximal Thigh and again from the mid calf to the ankle, with the greatest reflux time of 5724 milliseconds.  The GSV gives rise to a varicose branch measuring 4.2 mm off the  Proximal Calf that courses Medial with a reflux time of 2409 milliseconds.          The AASV is competent ( 2.2 mm) draining into the saphenofemoral junction.     The Giacomini vein is competent ( 1.9 mm) communicating with the small saphenous vein at the knee level.      The SSV demonstrates phasic flow, compresses and responds to augmentations from the popliteal space to the ankle.  No reflux or thrombus is seen. The saphenopopliteal junction is absent.      Perforators: There is an incompetent  vein ( 3.5 mm) at 12 cm inferior to the medial knee crease that communicates with PTV to GSV.        FINAL SUMMARY:  1.         Left chronic non-occlusive thrombus is noted in distal femoral vein and the popliteal  2.         Right common femoral vein and left common, proximal to distal femoral, and popliteal vein incompetence.  3.         Right great saphenous vein incompetence.  4.         Right small saphenous vein incompetence.  5.         Left great saphenous vein incompetence.  6.         Left   incompetence.  7.         Left varicose vein incompetence.  8.         The time of incompetence is greater than 500 milliseconds in  and superficial veins and greater than 1000 millisecond in deep veins.        A/P;  (I82.512) Chronic deep vein thrombosis (DVT) of femoral vein of left lower extremity (H) ( extensive DVT FV to bleow knee veins 6/2020)   (primary encounter diagnosis)  (I83.893) Varicose veins of bilateral lower extremities Left > RT CEAP 4 CVI  with other complications  (I87.009) Post-thrombotic syndrome  (E66.9,  Z68.33) Class 1 obesity without serious comorbidity with body mass index (BMI) of 33.0 to 33.9 in adult, unspecified obesity type    Continue to utilize compression stockings and elevate the legs when able  Continue aspirin daily with food  Congratulated for successful weight loss continue same plan  Reassess in 6 months, if no improvement or continues to have problems consider venous ablation  30 minutes spent on the date of the encounter doing chart review, history and exam, documentation and further activities as noted above    Adamaris Laughlin MD  Vascular Medicine

## 2022-04-13 NOTE — PROGRESS NOTES
[facility]        Patient is here for a  follow up.      Pt is currently taking Aspirin.    /84 (BP Location: Right arm, Patient Position: Chair, Cuff Size: Adult Regular)   Pulse (!) 48   Wt 243 lb 3.2 oz (110.3 kg)   SpO2 100%   BMI 32.09 kg/m      The provider has been notified that the patient has no concerns.     Questions patient would like addressed today are: N/A.    Refills are needed: N/A    Has homecare services and agency name:  Destini Agudelo MA

## 2022-09-03 ENCOUNTER — HEALTH MAINTENANCE LETTER (OUTPATIENT)
Age: 67
End: 2022-09-03

## 2022-09-07 ENCOUNTER — TELEPHONE (OUTPATIENT)
Dept: FAMILY MEDICINE | Facility: CLINIC | Age: 67
End: 2022-09-07

## 2022-09-07 NOTE — TELEPHONE ENCOUNTER
Called   Home Phone [phone]   Mobile [phone]     No answer and hung up.    Unable to leave a message, patient has not been seen by Carlos Fuller MD since 9/14/2020    Patient will need to be seen to discuss a referral, before orders can be placed.    If patient has a primary care provider outside of [facility]. That provider can place that referral.    Please provide the message and help patient get scheduled if he still needs the referral.     Cheryl Narvaez,

## 2022-09-07 NOTE — TELEPHONE ENCOUNTER
Reason for Call: Request for an order or referral:    Order or referral being requested: Referral for Rheumatology     Date needed: as soon as possible    Has the patient been seen by the PCP for this problem? NO    Additional comments: Requesting referral for any one within [facility]. Would like to get in soon. Pt would like my chart message once order is place.    Phone number Patient can be reached at:  Home number on file [phone] (home)    Best Time: N/A    Can we leave a detailed message on this number?  Not Applicable    Call taken on 9/7/2022 at 10:22 AM by Fred Fontenot

## 2022-09-07 NOTE — TELEPHONE ENCOUNTER
Patient returned call.  Appointment scheduled with Dr. Bryant tomorrow, Thursday, September 8th at 4:20 p.m.    Sushma Michael,

## 2022-09-08 ENCOUNTER — OFFICE VISIT (OUTPATIENT)
Dept: INTERNAL MEDICINE | Facility: CLINIC | Age: 67
End: 2022-09-08
Payer: COMMERCIAL

## 2022-09-08 VITALS
BODY MASS INDEX: 30.61 KG/M2 | HEART RATE: 65 BPM | DIASTOLIC BLOOD PRESSURE: 76 MMHG | WEIGHT: 232 LBS | SYSTOLIC BLOOD PRESSURE: 138 MMHG | TEMPERATURE: 98.2 F | RESPIRATION RATE: 14 BRPM | OXYGEN SATURATION: 98 %

## 2022-09-08 DIAGNOSIS — M51.369 DDD (DEGENERATIVE DISC DISEASE), LUMBAR: Primary | ICD-10-CM

## 2022-09-08 DIAGNOSIS — M35.3 PMR (POLYMYALGIA RHEUMATICA) (H): ICD-10-CM

## 2022-09-08 LAB
BASOPHILS # BLD AUTO: 0 10E3/UL (ref 0–0.2)
BASOPHILS NFR BLD AUTO: 1 %
EOSINOPHIL # BLD AUTO: 0.1 10E3/UL (ref 0–0.7)
EOSINOPHIL NFR BLD AUTO: 2 %
ERYTHROCYTE [DISTWIDTH] IN BLOOD BY AUTOMATED COUNT: 13.7 % (ref 10–15)
ERYTHROCYTE [SEDIMENTATION RATE] IN BLOOD BY WESTERGREN METHOD: 19 MM/HR (ref 0–20)
HCT VFR BLD AUTO: 39.8 % (ref 40–53)
HGB BLD-MCNC: 12.9 G/DL (ref 13.3–17.7)
LYMPHOCYTES # BLD AUTO: 1.3 10E3/UL (ref 0.8–5.3)
LYMPHOCYTES NFR BLD AUTO: 20 %
MCH RBC QN AUTO: 26.9 PG (ref 26.5–33)
MCHC RBC AUTO-ENTMCNC: 32.4 G/DL (ref 31.5–36.5)
MCV RBC AUTO: 83 FL (ref 78–100)
MONOCYTES # BLD AUTO: 0.6 10E3/UL (ref 0–1.3)
MONOCYTES NFR BLD AUTO: 10 %
NEUTROPHILS # BLD AUTO: 4.2 10E3/UL (ref 1.6–8.3)
NEUTROPHILS NFR BLD AUTO: 68 %
PLATELET # BLD AUTO: 261 10E3/UL (ref 150–450)
RBC # BLD AUTO: 4.79 10E6/UL (ref 4.4–5.9)
WBC # BLD AUTO: 6.2 10E3/UL (ref 4–11)

## 2022-09-08 PROCEDURE — 86039 ANTINUCLEAR ANTIBODIES (ANA): CPT | Performed by: INTERNAL MEDICINE

## 2022-09-08 PROCEDURE — 86140 C-REACTIVE PROTEIN: CPT | Performed by: INTERNAL MEDICINE

## 2022-09-08 PROCEDURE — 86038 ANTINUCLEAR ANTIBODIES: CPT | Performed by: INTERNAL MEDICINE

## 2022-09-08 PROCEDURE — 36415 COLL VENOUS BLD VENIPUNCTURE: CPT | Performed by: INTERNAL MEDICINE

## 2022-09-08 PROCEDURE — 85025 COMPLETE CBC W/AUTO DIFF WBC: CPT | Performed by: INTERNAL MEDICINE

## 2022-09-08 PROCEDURE — 85652 RBC SED RATE AUTOMATED: CPT | Performed by: INTERNAL MEDICINE

## 2022-09-08 PROCEDURE — 80053 COMPREHEN METABOLIC PANEL: CPT | Performed by: INTERNAL MEDICINE

## 2022-09-08 PROCEDURE — 99214 OFFICE O/P EST MOD 30 MIN: CPT | Performed by: INTERNAL MEDICINE

## 2022-09-08 RX ORDER — PREDNISONE 20 MG/1
20 TABLET ORAL DAILY
Qty: 60 TABLET | Refills: 1 | Status: SHIPPED | OUTPATIENT
Start: 2022-09-08 | End: 2022-12-08

## 2022-09-08 NOTE — PROGRESS NOTES
Assessment & Plan     DDD (degenerative disc disease), lumbar  This patient has presented with what seems to me most consistent with a polymyalgia rheumatica picture. Even without even seeing his erythrocyte sedimentation rate from today yet I feel treatment with prednisone is the most appropriate treatment decisions. I did place a rheumatological consultation as it is the long term picture with this diagnosis that can become a nettlesome affair. We agreed to run comprehensive studies today and rheumatological consultation orders placed   - ESR: Erythrocyte sedimentation rate; Future  - CRP, inflammation; Future  - Anti Nuclear Lani IgG by IFA with Reflex; Future  - CBC with platelets and differential; Future  - Comprehensive metabolic panel (BMP + Alb, Alk Phos, ALT, AST, Total. Bili, TP); Future  - Adult Rheumatology  Referral; Future  - predniSONE (DELTASONE) 20 MG tablet; Take 1 tablet (20 mg) by mouth daily 3 per day for 3 days, 2 per day for 3 days and then 1 per day for 2 weeks.  - Adult Rheumatology  Referral  - Comprehensive metabolic panel (BMP + Alb, Alk Phos, ALT, AST, Total. Bili, TP)  - CBC with platelets and differential  - Anti Nuclear Lani IgG by IFA with Reflex  - CRP, inflammation  - ESR: Erythrocyte sedimentation rate    PMR (polymyalgia rheumatica) (H)  As above   - ESR: Erythrocyte sedimentation rate; Future  - CRP, inflammation; Future  - Anti Nuclear Lani IgG by IFA with Reflex; Future  - CBC with platelets and differential; Future  - Comprehensive metabolic panel (BMP + Alb, Alk Phos, ALT, AST, Total. Bili, TP); Future  - Adult Rheumatology  Referral; Future  - predniSONE (DELTASONE) 20 MG tablet; Take 1 tablet (20 mg) by mouth daily 3 per day for 3 days, 2 per day for 3 days and then 1 per day for 2 weeks.  - Adult Rheumatology  Referral  - Comprehensive metabolic panel (BMP + Alb, Alk Phos, ALT, AST, Total. Bili, TP)  - CBC with platelets and  differential  - Anti Nuclear Lani IgG by IFA with Reflex  - CRP, inflammation  - ESR: Erythrocyte sedimentation rate    Review of the result(s) of each unique test - todays tests  Prescription drug management  15 minutes spent on the date of the encounter doing chart review, history and exam, documentation and further activities per the note        Return in about 6 months (around 3/8/2023) for Physical Exam.  See rheumatologist in follow up as available     Neftali Bryant MD  [facility] SABRA Gallo is a 67 year old presenting for the following health issues:  Shoulder Pain (Referral)      Shoulder Pain    History of Present Illness       Reason for visit:  Painful arthritis in both shoulders for the past month. Onset was sudden. Worse in morning and in bed. Had hip-joint pain for several months prior which may be related. Would like referral to see a rheumatologist, but system requires this visit first.    He eats 2-3 servings of fruits and vegetables daily.He consumes 0 sweetened beverage(s) daily.He exercises with enough effort to increase his heart rate 10 to 19 minutes per day.  He exercises with enough effort to increase his heart rate 3 or less days per week.   He is taking medications regularly.     A primary care patient generally with Carlos Fuller MD from [facility]  Health Maintenance Due   Topic Date Due     ANNUAL REVIEW OF  ORDERS  Never done     COVID-19 Vaccine (1) Never done     ZOSTER IMMUNIZATION (1 of 2) Never done     AORTIC ANEURYSM SCREENING (SYSTEM ASSIGNED)  Never done     MEDICARE ANNUAL WELLNESS VISIT  09/14/2021     Pneumococcal Vaccine: 65+ Years (2 - PCV) 09/14/2021     PHQ-2 (once per calendar year)  01/01/2022     INFLUENZA VACCINE (1) 09/01/2022     FALL RISK ASSESSMENT  09/15/2022      Last appointments were with FLORIDA CORNEJO MD vascular surgeon with [facility] for deep vein thrombosis 4- and  9-15-21  Last appointment with primary health care provider was 9-    Need to establish the goals of this office visit. Patient seeks rheumatological consultation but for which diagnosis / symptoms ? Patient has painful bilateral shoulder and came on a month ago out of nowhere. Hard to move.  Problems with crotch area,first  5 months ago, that is to say his symptoms include classic pelvic girdle as well as shoulder girdle symptomatology     Wt Readings from Last 5 Encounters:   09/08/22 105.2 kg (232 lb)   04/13/22 110.3 kg (243 lb 3.2 oz)   09/15/21 116.1 kg (256 lb)   09/14/20 114.8 kg (253 lb)   08/14/20 115.7 kg (255 lb)     Body mass index is 30.61 kg/m .    Answers for HPI/ROS submitted by the patient on 9/8/2022  What is the reason for your visit today? : Painful arthritis in both shoulders for the past month. Onset was sudden. Worse in morning and in bed. Had hip-joint pain for several months prior which may be related. Would like referral to see a rheumatologist, but system requires this visit first.  How many servings of fruits and vegetables do you eat daily?: 2-3  On average, how many sweetened beverages do you drink each day (Examples: soda, juice, sweet tea, etc.  Do NOT count diet or artificially sweetened beverages)?: 0  How many minutes a day do you exercise enough to make your heart beat faster?: 10 to 19  How many days a week do you exercise enough to make your heart beat faster?: 3 or less  How many days per week do you miss taking your medication?: 0        Review of Systems   Constitutional, HEENT, cardiovascular, pulmonary, gi and gu systems are negative, except as otherwise noted.      Objective    Pulse 65   Temp 98.2  F (36.8  C) (Oral)   Resp 14   Wt 105.2 kg (232 lb)   SpO2 98%   BMI 30.61 kg/m    Body mass index is 30.61 kg/m .  Physical Exam   GENERAL: healthy, alert and no distress  EYES: Eyes grossly normal to inspection, PERRL and conjunctivae and sclerae normal  RESP:  lungs clear to auscultation - no rales, rhonchi or wheezes  CV: regular rate and rhythm, normal S1 S2, no S3 or S4, no murmur, click or rub, no peripheral edema and peripheral pulses strong  ABDOMEN: soft, nontender, no hepatosplenomegaly, no masses and bowel sounds normal  MS: no synovitis findings or obvious joint swelling but he's walking like he is 89 and has shoulder girdle and pelvic girdle pains associated with a clinical scenario suggestive of polymyalgia rheumatica     Orders Placed This Encounter   Procedures     REVIEW OF HEALTH MAINTENANCE PROTOCOL ORDERS     ESR: Erythrocyte sedimentation rate     CRP, inflammation     Anti Nuclear Lani IgG by IFA with Reflex     Comprehensive metabolic panel (BMP + Alb, Alk Phos, ALT, AST, Total. Bili, TP)     CBC with platelets and differential     Adult Rheumatology  Referral     CBC with platelets and differential

## 2022-09-09 LAB
ALBUMIN SERPL-MCNC: 3.4 G/DL (ref 3.4–5)
ALP SERPL-CCNC: 69 U/L (ref 40–150)
ALT SERPL W P-5'-P-CCNC: 19 U/L (ref 0–70)
ANION GAP SERPL CALCULATED.3IONS-SCNC: 4 MMOL/L (ref 3–14)
AST SERPL W P-5'-P-CCNC: 18 U/L (ref 0–45)
BILIRUB SERPL-MCNC: 0.9 MG/DL (ref 0.2–1.3)
BUN SERPL-MCNC: 23 MG/DL (ref 7–30)
CALCIUM SERPL-MCNC: 9.1 MG/DL (ref 8.5–10.1)
CHLORIDE BLD-SCNC: 106 MMOL/L (ref 94–109)
CO2 SERPL-SCNC: 31 MMOL/L (ref 20–32)
CREAT SERPL-MCNC: 0.96 MG/DL (ref 0.66–1.25)
CRP SERPL-MCNC: 20.4 MG/L (ref 0–8)
GFR SERPL CREATININE-BSD FRML MDRD: 87 ML/MIN/1.73M2
GLUCOSE BLD-MCNC: 95 MG/DL (ref 70–99)
POTASSIUM BLD-SCNC: 4.5 MMOL/L (ref 3.4–5.3)
PROT SERPL-MCNC: 7 G/DL (ref 6.8–8.8)
SODIUM SERPL-SCNC: 141 MMOL/L (ref 133–144)

## 2022-09-12 LAB
ANA PAT SER IF-IMP: ABNORMAL
ANA SER QL IF: POSITIVE
ANA TITR SER IF: ABNORMAL {TITER}

## 2022-09-14 ENCOUNTER — TELEPHONE (OUTPATIENT)
Dept: FAMILY MEDICINE | Facility: CLINIC | Age: 67
End: 2022-09-14

## 2022-09-14 NOTE — TELEPHONE ENCOUNTER
So we are reporting the follow up review of all these laboratory studies we ordered at patient appointment. These tests are not absolutely clear to me. I had expected to find the erythrocyte sedimentation rate elevated but it as within normal limits and the C reactive protein was elevated along with the angélica [ flourescent anti-nuclear antibody test  ] . There is also a slight anemia now. All of these abnormalities are not absolutely clear what's going on. In my opinion the next logical step is a rheumatological consultation , but these appointments can take a couple of months to get in so lets get some feedback from patient / status report , how is he doing ? Reroute with update, I may need to call patient if things are uncertain from his perspective      Neftali Bryant MD      Left message on answering machine for patient to call back to the nurse at [phone].    Gabby Bae RN  [facility]

## 2022-09-19 NOTE — TELEPHONE ENCOUNTER
Patient calls back.     Prednisone helped symptoms - significantly. He feels very much improved since visit.    He was contacted by rheumatology scheduling - quoted March 2023 as next appointment date. He is on the waitlist, but would very much like to be seen sooner and/or discuss alternative plan. He also notes that he was very impressed by visit with Dr. Bryant and would like to establish care/ make PCP moving forward.     Routed to PCP to review and advise.     Nathaly Mckeon, RN, BSN, PHN  [facility]: [place]

## 2022-09-19 NOTE — TELEPHONE ENCOUNTER
Request patient to schedule complete physical exam , best to wait until I am back form my upcoming total knee replacement     Neftali Bryant MD

## 2022-09-20 NOTE — TELEPHONE ENCOUNTER
Left message on voicemail advising patient to return call at [phone].    When patient calls back, please see message below by Dr. Bryant and assist with scheduling.    Mirela ROSARIO, RN  [facility], [place]

## 2022-09-20 NOTE — TELEPHONE ENCOUNTER
Pt returned call      Was not able to book physical until 12/8.  Patient wants to make sure ok to wait this long.      Marilyn Morelos RN

## 2022-09-21 NOTE — TELEPHONE ENCOUNTER
Patient called and informed physical in December is okay, but if any new symptoms or concerns to call back and we can get him scheduled with another provider.  Said he is on waiting list to see rheumatology.  Sushma Michael,

## 2022-09-21 NOTE — TELEPHONE ENCOUNTER
It's likely fine. If he were to develop urgent concerns about symptoms or issues he'd need to call and ask for sooner appointment. This would wind up being a problem focused visit and not a complete physical exam though, most likely     Neftali Bryant MD

## 2022-12-07 ASSESSMENT — ENCOUNTER SYMPTOMS
DYSURIA: 0
ABDOMINAL PAIN: 0
COUGH: 0
PARESTHESIAS: 0
EYE PAIN: 0
WEAKNESS: 0
HEARTBURN: 0
SORE THROAT: 0
CONSTIPATION: 0
FREQUENCY: 0
FEVER: 0
HEMATOCHEZIA: 0
ARTHRALGIAS: 0
HEADACHES: 0
DIZZINESS: 0
MYALGIAS: 0
CHILLS: 0
DIARRHEA: 0
JOINT SWELLING: 0
HEMATURIA: 0
SHORTNESS OF BREATH: 0
PALPITATIONS: 0
NERVOUS/ANXIOUS: 0
NAUSEA: 0

## 2022-12-07 ASSESSMENT — ACTIVITIES OF DAILY LIVING (ADL): CURRENT_FUNCTION: NO ASSISTANCE NEEDED

## 2022-12-08 ENCOUNTER — OFFICE VISIT (OUTPATIENT)
Dept: INTERNAL MEDICINE | Facility: CLINIC | Age: 67
End: 2022-12-08
Payer: COMMERCIAL

## 2022-12-08 VITALS
RESPIRATION RATE: 12 BRPM | OXYGEN SATURATION: 98 % | WEIGHT: 237 LBS | HEIGHT: 73 IN | HEART RATE: 60 BPM | BODY MASS INDEX: 31.41 KG/M2 | TEMPERATURE: 98.5 F | DIASTOLIC BLOOD PRESSURE: 74 MMHG | SYSTOLIC BLOOD PRESSURE: 124 MMHG

## 2022-12-08 DIAGNOSIS — Z00.00 ENCOUNTER FOR MEDICARE ANNUAL WELLNESS EXAM: Primary | ICD-10-CM

## 2022-12-08 DIAGNOSIS — Z13.6 CARDIOVASCULAR SCREENING; LDL GOAL LESS THAN 130: ICD-10-CM

## 2022-12-08 DIAGNOSIS — Z86.718 PERSONAL HISTORY OF DVT (DEEP VEIN THROMBOSIS): ICD-10-CM

## 2022-12-08 DIAGNOSIS — Z13.6 ENCOUNTER FOR SCREENING FOR ABDOMINAL AORTIC ANEURYSM (AAA) IN PATIENT 50 YEARS OF AGE OR OLDER WITHOUT OTHER RISK FACTORS FOR AAA: ICD-10-CM

## 2022-12-08 LAB — ERYTHROCYTE [SEDIMENTATION RATE] IN BLOOD BY WESTERGREN METHOD: 7 MM/HR (ref 0–20)

## 2022-12-08 PROCEDURE — 99397 PER PM REEVAL EST PAT 65+ YR: CPT | Performed by: INTERNAL MEDICINE

## 2022-12-08 PROCEDURE — 85652 RBC SED RATE AUTOMATED: CPT | Performed by: INTERNAL MEDICINE

## 2022-12-08 PROCEDURE — 36415 COLL VENOUS BLD VENIPUNCTURE: CPT | Performed by: INTERNAL MEDICINE

## 2022-12-08 PROCEDURE — 80061 LIPID PANEL: CPT | Performed by: INTERNAL MEDICINE

## 2022-12-08 PROCEDURE — 86140 C-REACTIVE PROTEIN: CPT | Performed by: INTERNAL MEDICINE

## 2022-12-08 PROCEDURE — G0103 PSA SCREENING: HCPCS | Performed by: INTERNAL MEDICINE

## 2022-12-08 PROCEDURE — 80048 BASIC METABOLIC PNL TOTAL CA: CPT | Performed by: INTERNAL MEDICINE

## 2022-12-08 ASSESSMENT — ENCOUNTER SYMPTOMS
DIZZINESS: 0
HEADACHES: 0
PALPITATIONS: 0
NERVOUS/ANXIOUS: 0
PARESTHESIAS: 0
ARTHRALGIAS: 0
FEVER: 0
WEAKNESS: 0
JOINT SWELLING: 0
HEMATURIA: 0
DIARRHEA: 0
HEMATOCHEZIA: 0
FREQUENCY: 0
COUGH: 0
NAUSEA: 0
CONSTIPATION: 0
DYSURIA: 0
SHORTNESS OF BREATH: 0
ABDOMINAL PAIN: 0
HEARTBURN: 0
SORE THROAT: 0
MYALGIAS: 0
CHILLS: 0
EYE PAIN: 0

## 2022-12-08 ASSESSMENT — ACTIVITIES OF DAILY LIVING (ADL): CURRENT_FUNCTION: NO ASSISTANCE NEEDED

## 2022-12-08 NOTE — LETTER
December 12, 2022    Sabino Atwood  [address] [address]          Dear ,    We are writing to inform you of your test results.  All of these tests are within acceptable limits , things look good !     Resulted Orders   PSA, screen   Result Value Ref Range    Prostate Specific Antigen Screen 0.65 0.00 - 4.00 ug/L   Lipid panel reflex to direct LDL Non-fasting   Result Value Ref Range    Cholesterol 218 (H) <200 mg/dL    Triglycerides 137 <150 mg/dL    Direct Measure HDL 83 >=40 mg/dL    LDL Cholesterol Calculated 108 (H) <=100 mg/dL    Non HDL Cholesterol 135 (H) <130 mg/dL    Patient Fasting > 8hrs? Unknown     Narrative    Cholesterol  Desirable:  <200 mg/dL    Triglycerides  Normal:  Less than 150 mg/dL  Borderline High:  150-199 mg/dL  High:  200-499 mg/dL  Very High:  Greater than or equal to 500 mg/dL    Direct Measure HDL  Female:  Greater than or equal to 50 mg/dL   Male:  Greater than or equal to 40 mg/dL    LDL Cholesterol  Desirable:  <100mg/dL  Above Desirable:  100-129 mg/dL   Borderline High:  130-159 mg/dL   High:  160-189 mg/dL   Very High:  >= 190 mg/dL    Non HDL Cholesterol  Desirable:  130 mg/dL  Above Desirable:  130-159 mg/dL  Borderline High:  160-189 mg/dL  High:  190-219 mg/dL  Very High:  Greater than or equal to 220 mg/dL   Basic metabolic panel  (Ca, Cl, CO2, Creat, Gluc, K, Na, BUN)   Result Value Ref Range    Sodium 142 133 - 144 mmol/L    Potassium 4.5 3.4 - 5.3 mmol/L    Chloride 109 94 - 109 mmol/L    Carbon Dioxide (CO2) 28 20 - 32 mmol/L    Anion Gap 5 3 - 14 mmol/L    Urea Nitrogen 28 7 - 30 mg/dL    Creatinine 0.95 0.66 - 1.25 mg/dL    Calcium 9.0 8.5 - 10.1 mg/dL    Glucose 102 (H) 70 - 99 mg/dL    GFR Estimate 88 >60 mL/min/1.73m2      Comment:      Effective December 21, 2021 eGFRcr in adults is calculated using the 2021 CKD-EPI creatinine equation which includes age and gender (Denis ramirez al., NEJ, DOI: 10.1056/BTZZof5138659)   CRP,  inflammation   Result Value Ref Range    CRP Inflammation <2.9 0.0 - 8.0 mg/L   ESR: Erythrocyte sedimentation rate   Result Value Ref Range    Erythrocyte Sedimentation Rate 7 0 - 20 mm/hr       If you have any questions or concerns, please call the clinic at the number listed above.       Sincerely,      Neftali Bryant MD

## 2022-12-08 NOTE — PATIENT INSTRUCTIONS
Patient Education   Personalized Prevention Plan  You are due for the preventive services outlined below.  Your care team is available to assist you in scheduling these services.  If you have already completed any of these items, please share that information with your care team to update in your medical record.  Health Maintenance Due   Topic Date Due     COVID-19 Vaccine (1) Never done     Zoster (Shingles) Vaccine (1 of 2) Never done     AORTIC ANEURYSM SCREENING (SYSTEM ASSIGNED)  Never done     Annual Wellness Visit  09/14/2021     Pneumococcal Vaccine (2 - PCV) 09/14/2021     Flu Vaccine (1) 09/01/2022       Understanding USDA MyPlate  The USDA has guidelines to help you make healthy food choices. These are called MyPlate. MyPlate shows the food groups that make up healthy meals using the image of a place setting. Before you eat, think about the healthiest choices for what to put on your plate or in your cup or bowl. To learn more about building a healthy plate, visit www.choosemyplate.gov.    The food groups    Fruits. Any fruit or 100% fruit juice counts as part of the Fruit Group. Fruits may be fresh, canned, frozen, or dried, and may be whole, cut-up, or pureed. Make 1/2 of your plate fruits and vegetables.    Vegetables. Any vegetable or 100% vegetable juice counts as a member of the Vegetable Group. Vegetables may be fresh, frozen, canned, or dried. They can be served raw or cooked and may be whole, cut-up, or mashed. Make 1/2 of your plate fruits and vegetables.    Grains. All foods made from grains are part of the Grains Group. These include wheat, rice, oats, cornmeal, and barley. Grains are often used to make foods such as bread, pasta, oatmeal, cereal, tortillas, and grits. Grains should be no more than 1/4 of your plate. At least half of your grains should be whole grains.    Protein. This group includes meat, poultry, seafood, beans and peas, eggs, processed soy products (such as tofu), nuts  (including nut butters), and seeds. Make protein choices no more than 1/4 of your plate. Meat and poultry choices should be lean or low fat.    Dairy. The Dairy Group includes all fluid milk products and foods made from milk that contain calcium, such as yogurt and cheese. (Foods that have little calcium, such as cream, butter, and cream cheese, are not part of this group.) Most dairy choices should be low-fat or fat-free.    Oils. Oils aren't a food group, but they do contain essential nutrients. However it's important to watch your intake of oils. These are fats that are liquid at room temperature. They include canola, corn, olive, soybean, vegetable, and sunflower oil. Foods that are mainly oil include mayonnaise, certain salad dressings, and soft margarines. You likely already get your daily oil allowance from the foods you eat.  Things to limit  Eating healthy also means limiting these things in your diet:       Salt (sodium). Many processed foods have a lot of sodium. To keep sodium intake down, eat fresh vegetables, meats, poultry, and seafood when possible. Purchase low-sodium, reduced-sodium, or no-salt-added food products at the store. And don't add salt to your meals at home. Instead, season them with herbs and spices such as dill, oregano, cumin, and paprika. Or try adding flavor with lemon or lime zest and juice.    Saturated fat. Saturated fats are most often found in animal products such as beef, pork, and chicken. They are often solid at room temperature, such as butter. To reduce your saturated fat intake, choose leaner cuts of meat and poultry. And try healthier cooking methods such as grilling, broiling, roasting, or baking. For a simple lower-fat swap, use plain nonfat yogurt instead of mayonnaise when making potato salad or macaroni salad.    Added sugars. These are sugars added to foods. They are in foods such as ice cream, candy, soda, fruit drinks, sports drinks, energy drinks, cookies,  pastries, jams, and syrups. Cut down on added sugars by sharing sweet treats with a family member or friend. You can also choose fruit for dessert, and drink water or other unsweetened beverages.     [employer] last reviewed this educational content on 6/1/2020 2000-2021 [employer]. All rights reserved. This information is not intended as a substitute for professional medical care. Always follow your healthcare professional's instructions.

## 2022-12-08 NOTE — PROGRESS NOTES
"SUBJECTIVE:   Sabino is a 67 year old who presents for Preventive Visit.    Patient has been advised of split billing requirements and indicates understanding: Yes  Are you in the first 12 months of your Medicare coverage?  No    Healthy Habits:     In general, how would you rate your overall health?  Good    Frequency of exercise:  2-3 days/week    Duration of exercise:  30-45 minutes    Do you usually eat at least 4 servings of fruit and vegetables a day, include whole grains    & fiber and avoid regularly eating high fat or \"junk\" foods?  No    Taking medications regularly:  Yes    Medication side effects:  None    Ability to successfully perform activities of daily living:  No assistance needed    Home Safety:  No safety concerns identified    Hearing Impairment:  No hearing concerns    In the past 6 months, have you been bothered by leaking of urine?  No    In general, how would you rate your overall mental or emotional health?  Excellent      PHQ-2 Total Score: 0    Additional concerns today:  No    Wt Readings from Last 5 Encounters:   12/08/22 107.5 kg (237 lb)   09/08/22 105.2 kg (232 lb)   04/13/22 110.3 kg (243 lb 3.2 oz)   09/15/21 116.1 kg (256 lb)   09/14/20 114.8 kg (253 lb)     Body mass index is 31.27 kg/m .    Have you ever done Advance Care Planning? (For example, a Health Directive, POLST, or a discussion with a medical provider or your loved ones about your wishes): No, advance care planning information given to patient to review.  Advanced care planning was discussed at today's visit.    Fall risk  Fallen 2 or more times in the past year?: No  Any fall with injury in the past year?: No    Cognitive Screening   1) Repeat 3 items (Leader, Season, Table)    2) Clock draw: NORMAL  3) 3 item recall: Recalls 3 objects  Results: 3 items recalled: COGNITIVE IMPAIRMENT LESS LIKELY    Mini-CogTM Copyright S Ariana. Licensed by the author for use in [employer]; reprinted with permission " (isaiah@[facility]). All rights reserved.      Do you have sleep apnea, excessive snoring or daytime drowsiness?: no    Reviewed and updated as needed this visit by clinical staff                  Reviewed and updated as needed this visit by Provider                 Social History     Tobacco Use     Smoking status: Never     Smokeless tobacco: Never   Substance Use Topics     Alcohol use: Yes     Comment: once a month or less (glass of wine)     If you drink alcohol do you typically have >3 drinks per day or >7 drinks per week? No    Alcohol Use 12/7/2022   Prescreen: >3 drinks/day or >7 drinks/week? Not Applicable   No flowsheet data found.      Current providers sharing in care for this patient include:   Patient Care Team:  [facility] as PCP - General  Adamaris Laughlin MD as Assigned Heart and Vascular Provider  Neftali Bryant MD as Assigned PCP    The following health maintenance items are reviewed in Epic and correct as of today:  Health Maintenance   Topic Date Due     COVID-19 Vaccine (1) Never done     ZOSTER IMMUNIZATION (1 of 2) Never done     AORTIC ANEURYSM SCREENING (SYSTEM ASSIGNED)  Never done     MEDICARE ANNUAL WELLNESS VISIT  09/14/2021     Pneumococcal Vaccine: 65+ Years (2 - PCV) 09/14/2021     INFLUENZA VACCINE (1) 09/01/2022     ANNUAL REVIEW OF HM ORDERS  09/08/2023     FALL RISK ASSESSMENT  12/08/2023     LIPID  07/14/2025     ADVANCE CARE PLANNING  09/15/2025     DTAP/TDAP/TD IMMUNIZATION (2 - Td or Tdap) 08/29/2027     COLORECTAL CANCER SCREENING  06/09/2032     HEPATITIS C SCREENING  Completed     PHQ-2 (once per calendar year)  Completed     IPV IMMUNIZATION  Aged Out     MENINGITIS IMMUNIZATION  Aged Out     Health Maintenance Due   Topic Date Due     COVID-19 Vaccine (1) Never done     ZOSTER IMMUNIZATION (1 of 2) Never done     AORTIC ANEURYSM SCREENING (SYSTEM ASSIGNED)  Never done     MEDICARE ANNUAL WELLNESS VISIT  09/14/2021     Pneumococcal Vaccine: 65+ Years (2 -  PCV) 09/14/2021     INFLUENZA VACCINE (1) 09/01/2022      For complete details please see most recent previous office visit with me . He had a theoretical diagnosis of polymyalgia rheumatica . He took a hefty dose of prednisone for September and October and got down to one pil per day and all the way through today. I had a detailed discussion with patient that I don't want him taking any more prednisone. I don't think we have a solid feel about this diagnosis of musculoskeletal issues and further follow up may require a rheumatological consultation , the good news is he feels much improved at this point      Lab work is in process  Labs reviewed in EPIC  BP Readings from Last 3 Encounters:   12/08/22 124/74   09/08/22 138/76   04/13/22 134/84    Wt Readings from Last 3 Encounters:   12/08/22 107.5 kg (237 lb)   09/08/22 105.2 kg (232 lb)   04/13/22 110.3 kg (243 lb 3.2 oz)                  Patient Active Problem List   Diagnosis     CARDIOVASCULAR SCREENING; LDL GOAL LESS THAN 160     DDD (degenerative disc disease), lumbar     Lyme disease     Nephrolithiasis     Past Surgical History:   Procedure Laterality Date     ARTHROSCOPY KNEE Left 9/2017    medial meniscal tear      microdiscectomy      Multiple surgeries for back issues     Right knee surgery      Right ACL ablated       Social History     Tobacco Use     Smoking status: Never     Smokeless tobacco: Never   Substance Use Topics     Alcohol use: Not Currently     Comment: rarely     Family History   Problem Relation Age of Onset     Heart Disease Mother         CHF     Diabetes Mother      Hypertension Mother      Neurologic Disorder Father         Polio with respiratory complications.     Arthritis Brother      Family History Negative Maternal Grandmother      Family History Negative Maternal Grandfather      Family History Negative Paternal Grandmother      Family History Negative Paternal Grandfather      Diabetes Brother          Current Outpatient  "Medications   Medication Sig Dispense Refill     predniSONE (DELTASONE) 20 MG tablet Take 1 tablet (20 mg) by mouth daily 3 per day for 3 days, 2 per day for 3 days and then 1 per day for 2 weeks. 60 tablet 1     aspirin (ASA) 325 MG EC tablet Take 325 mg by mouth every 6 hours as needed for moderate pain (Patient not taking: Reported on 9/8/2022)       Allergies   Allergen Reactions     Tetanus Toxoid Other (See Comments)     Had shot in 2006     Recent Labs   Lab Test 09/08/22  1704 09/14/20  0758 07/14/20  0727   LDL  --   --  104*   HDL  --   --  55   TRIG  --   --  103   ALT 19  --   --    CR 0.96 1.19  --    GFRESTIMATED 87 64  --    GFRESTBLACK  --  74  --    POTASSIUM 4.5 4.3  --             Review of Systems   Constitutional: Negative for chills and fever.   HENT: Negative for congestion, ear pain, hearing loss and sore throat.    Eyes: Negative for pain and visual disturbance.   Respiratory: Negative for cough and shortness of breath.    Cardiovascular: Negative for chest pain, palpitations and peripheral edema.   Gastrointestinal: Negative for abdominal pain, constipation, diarrhea, heartburn, hematochezia and nausea.   Genitourinary: Negative for dysuria, frequency, genital sores, hematuria, impotence, penile discharge and urgency.   Musculoskeletal: Negative for arthralgias, joint swelling and myalgias.   Skin: Negative for rash.   Neurological: Negative for dizziness, weakness, headaches and paresthesias.   Psychiatric/Behavioral: Negative for mood changes. The patient is not nervous/anxious.      Constitutional, HEENT, cardiovascular, pulmonary, gi and gu systems are negative, except as otherwise noted.    OBJECTIVE:   /74   Pulse 60   Temp 98.5  F (36.9  C) (Oral)   Resp 12   Wt 107.5 kg (237 lb)   SpO2 98%   BMI 31.27 kg/m   Estimated body mass index is 30.61 kg/m  as calculated from the following:    Height as of 9/15/21: 1.854 m (6' 1\").    Weight as of 9/8/22: 105.2 kg (232 " lb).  Physical Exam   GENERAL: healthy, alert and no distress  EYES: Eyes grossly normal to inspection, PERRL and conjunctivae and sclerae normal  HENT: ear canals and TM's normal, nose and mouth without ulcers or lesions  NECK: no adenopathy, no asymmetry, masses, or scars and thyroid normal to palpation  RESP: lungs clear to auscultation - no rales, rhonchi or wheezes  CV: regular rate and rhythm, normal S1 S2, no S3 or S4, no murmur, click or rub, no peripheral edema and peripheral pulses strong  ABDOMEN: soft, nontender, no hepatosplenomegaly, no masses and bowel sounds normal  MS: no gross musculoskeletal defects noted, no edema  SKIN: no suspicious lesions or rashes  NEURO: Normal strength and tone, mentation intact and speech normal  PSYCH: mentation appears normal, affect normal/bright    Diagnostic Test Results:  Labs reviewed in Epic  Orders Placed This Encounter   Procedures      [employer] Medicare AAA Screening     PSA, screen     Lipid panel reflex to direct LDL Non-fasting     Basic metabolic panel  (Ca, Cl, CO2, Creat, Gluc, K, Na, BUN)     CRP, inflammation     ESR: Erythrocyte sedimentation rate         ASSESSMENT / PLAN:   (Z00.00) Encounter for Medicare annual wellness exam  (primary encounter diagnosis)  Comment: routine screening issues   Plan: Basic metabolic panel  (Ca, Cl, CO2, Creat,         Gluc, K, Na, BUN)        See orders section of this encounter     (Z86.888) Personal history of DVT (deep vein thrombosis)  Comment: noted as a point of historical importance   Plan: PSA, screen, CRP, inflammation, ESR:         Erythrocyte sedimentation rate            (Z13.6) Encounter for screening for abdominal aortic aneurysm (AAA) in patient 50 years of age or older without other risk factors for AAA  Comment: diagnosis explained , rationale explained  Plan: [place] Aorta Medicare AAA Screening        Screening     (Z13.6) CARDIOVASCULAR SCREENING; LDL GOAL LESS THAN 130  Comment: routine screening   Plan:  Lipid panel reflex to direct LDL Non-fasting              Patient has been advised of split billing requirements and indicates understanding: Yes      COUNSELING:  Reviewed preventive health counseling, as reflected in patient instructions        He reports that he has never smoked. He has never used smokeless tobacco.      Appropriate preventive services were discussed with this patient, including applicable screening as appropriate for cardiovascular disease, diabetes, osteopenia/osteoporosis, and glaucoma.  As appropriate for age/gender, discussed screening for colorectal cancer, prostate cancer, breast cancer, and cervical cancer. Checklist reviewing preventive services available has been given to the patient.    Reviewed patients plan of care and provided an AVS. The Basic Care Plan (routine screening as documented in Health Maintenance) for Sabino meets the Care Plan requirement. This Care Plan has been established and reviewed with the Patient.          Neftali Bryant MD  [facility]    Identified Health Risks:    The patient was counseled and encouraged to consider modifying their diet and eating habits. He was provided with information on recommended healthy diet options.

## 2022-12-09 LAB
ANION GAP SERPL CALCULATED.3IONS-SCNC: 5 MMOL/L (ref 3–14)
BUN SERPL-MCNC: 28 MG/DL (ref 7–30)
CALCIUM SERPL-MCNC: 9 MG/DL (ref 8.5–10.1)
CHLORIDE BLD-SCNC: 109 MMOL/L (ref 94–109)
CHOLEST SERPL-MCNC: 218 MG/DL
CO2 SERPL-SCNC: 28 MMOL/L (ref 20–32)
CREAT SERPL-MCNC: 0.95 MG/DL (ref 0.66–1.25)
CRP SERPL-MCNC: <2.9 MG/L (ref 0–8)
FASTING STATUS PATIENT QL REPORTED: ABNORMAL
GFR SERPL CREATININE-BSD FRML MDRD: 88 ML/MIN/1.73M2
GLUCOSE BLD-MCNC: 102 MG/DL (ref 70–99)
HDLC SERPL-MCNC: 83 MG/DL
LDLC SERPL CALC-MCNC: 108 MG/DL
NONHDLC SERPL-MCNC: 135 MG/DL
POTASSIUM BLD-SCNC: 4.5 MMOL/L (ref 3.4–5.3)
PSA SERPL-MCNC: 0.65 UG/L (ref 0–4)
SODIUM SERPL-SCNC: 142 MMOL/L (ref 133–144)
TRIGL SERPL-MCNC: 137 MG/DL

## 2023-01-25 ENCOUNTER — ANCILLARY PROCEDURE (OUTPATIENT)
Dept: ULTRASOUND IMAGING | Facility: CLINIC | Age: 68
End: 2023-01-25
Attending: INTERNAL MEDICINE
Payer: COMMERCIAL

## 2023-01-25 DIAGNOSIS — Z13.6 ENCOUNTER FOR SCREENING FOR ABDOMINAL AORTIC ANEURYSM (AAA) IN PATIENT 50 YEARS OF AGE OR OLDER WITHOUT OTHER RISK FACTORS FOR AAA: ICD-10-CM

## 2023-01-25 PROCEDURE — 76706 US ABDL AORTA SCREEN AAA: CPT | Mod: TC | Performed by: RADIOLOGY

## 2023-01-26 ENCOUNTER — TELEPHONE (OUTPATIENT)
Dept: OTHER | Facility: CLINIC | Age: 68
End: 2023-01-26
Payer: COMMERCIAL

## 2023-01-26 NOTE — TELEPHONE ENCOUNTER
[facility]    Who is the name of the provider?:  [place]     What is the location you see this provider at/preferred location?: [place]   Person calling: Sabino   Phone number:  [phone]   Nurse call back needed: yes   Reason for call:   Pt is due for 6 month follow up with Dr. Laughlin  10/13/22. He has received letter to scheduled follow up.     Pt stating he does not want to come just to see the doctor and would like to know if any imaging will be done prior to the appointment .     [place]arm[place]:  [place]   Outside Imaging:[place]   Can we leave a detailed message on this number?  Yes

## 2023-01-26 NOTE — TELEPHONE ENCOUNTER
Per last office visit 4/13/22    Reassess in 6 months, if no improvement or continues to have problems consider venous ablation    Discussed with patient.  He will call us PRN.  Follow up orders removed from Epic.    Rocío Em RN BSN  [facility]  [phone]

## 2023-08-26 NOTE — PROGRESS NOTES
"Subjective     Sabino Atwood is a 64 year old male who presents to clinic today for the following health issues:    HPI    Joint Pain    Onset: 1 month    Description:   Location: lower left leg  Character: Cramping    Intensity: mild    Progression of Symptoms: same    Accompanying Signs & Symptoms:  Other symptoms: swelling and discoloration of lower leg    History:   Previous similar pain: no       Precipitating factors:   Trauma or overuse: no     Alleviating factors:  Improved by: nothing    Therapies Tried and outcome: compression stockings    Started about 2 months ago while shopping had a cramp in the calf.  Rt calf has some atrophy.  Also swelled up, and pita hard initially but softening now. Does not really hurt.  He has not had a physical in a long while.    Family History   Problem Relation Age of Onset     Heart Disease Mother         CHF     Neurologic Disorder Father         Polio with respiratory complications.     Arthritis Brother      Family History Negative Maternal Grandmother      Family History Negative Maternal Grandfather      Family History Negative Paternal Grandmother      Family History Negative Paternal Grandfather      Review of Systems   Constitutional, HEENT, cardiovascular, pulmonary, gi and gu systems are negative, except as otherwise noted.      Objective    /80   Pulse 70   Temp 97.5  F (36.4  C) (Oral)   Resp 14   Ht 1.88 m (6' 2\")   Wt 112.5 kg (248 lb)   SpO2 98%   BMI 31.84 kg/m    Body mass index is 31.84 kg/m .  Physical Exam   GENERAL: healthy, alert and no distress  RESP: lungs clear to auscultation - no rales, rhonchi or wheezes  CV: regular rate and rhythm, no murmur, click or rub and peripheral pulses present  MS:    Lt lower extremity    Swollen with mild erythema lower half, pitting a little, no calf tenderness.    No tender adenopathy deep in Lt groin.    Results for orders placed or performed in visit on 06/25/20   CBC with platelets differential     " "Status: None   Result Value Ref Range    WBC 5.9 4.0 - 11.0 10e9/L    RBC Count 5.02 4.4 - 5.9 10e12/L    Hemoglobin 14.0 13.3 - 17.7 g/dL    Hematocrit 43.0 40.0 - 53.0 %    MCV 86 78 - 100 fl    MCH 27.9 26.5 - 33.0 pg    MCHC 32.6 31.5 - 36.5 g/dL    RDW 14.9 10.0 - 15.0 %    Platelet Count 211 150 - 450 10e9/L    % Neutrophils 68.2 %    % Lymphocytes 20.4 %    % Monocytes 9.9 %    % Eosinophils 1.2 %    % Basophils 0.3 %    Absolute Neutrophil 4.0 1.6 - 8.3 10e9/L    Absolute Lymphocytes 1.2 0.8 - 5.3 10e9/L    Absolute Monocytes 0.6 0.0 - 1.3 10e9/L    Absolute Eosinophils 0.1 0.0 - 0.7 10e9/L    Absolute Basophils 0.0 0.0 - 0.2 10e9/L    Diff Method Automated Method      Assessment & Plan     Sabino was seen today for musculoskeletal problem.    Diagnoses and all orders for this visit:    Left leg swelling    Discussed differentials: DVT, Lymphedema, Lymphatic channel obstruction. DVT most likely will order US and check  D dime. Additional lab work; CBC, BNP, BMP  -     US Lower Extremity Venous Duplex Left; Future  -     CBC with platelets differential  -     D dimer quantitative    Adenopathy: Lt groin     Reactive or neoplastic process; given is solitary most likely reactive. CBC normal. Will await US, if no blood clot will pursue work up for adenopathy.  -     US Lower Extremity Venous Duplex Left; Future  -     CBC with platelets differential      BMI:   Estimated body mass index is 31.84 kg/m  as calculated from the following:    Height as of this encounter: 1.88 m (6' 2\").    Weight as of this encounter: 112.5 kg (248 lb).   Weight management plan: Discussed healthy diet and exercise guidelines    Return in about 1 day (around 6/26/2020).  More than 50% of the time spent with patient on counseling / coordinating his care. Total appointment times was 25 minutes.   Carlos Fuller MD  [facility]    " Principal Discharge DX:	Back pain   1

## 2024-02-17 ENCOUNTER — HEALTH MAINTENANCE LETTER (OUTPATIENT)
Age: 69
End: 2024-02-17

## 2025-03-08 ENCOUNTER — HEALTH MAINTENANCE LETTER (OUTPATIENT)
Age: 70
End: 2025-03-08